# Patient Record
Sex: MALE | Race: OTHER | NOT HISPANIC OR LATINO | URBAN - METROPOLITAN AREA
[De-identification: names, ages, dates, MRNs, and addresses within clinical notes are randomized per-mention and may not be internally consistent; named-entity substitution may affect disease eponyms.]

---

## 2017-11-17 ENCOUNTER — EMERGENCY (EMERGENCY)
Facility: HOSPITAL | Age: 62
LOS: 1 days | Discharge: HOME | End: 2017-11-17
Attending: EMERGENCY MEDICINE | Admitting: EMERGENCY MEDICINE
Payer: COMMERCIAL

## 2017-11-17 VITALS
HEART RATE: 86 BPM | DIASTOLIC BLOOD PRESSURE: 69 MMHG | RESPIRATION RATE: 18 BRPM | SYSTOLIC BLOOD PRESSURE: 131 MMHG | OXYGEN SATURATION: 97 %

## 2017-11-17 VITALS — HEIGHT: 66 IN | WEIGHT: 285.06 LBS

## 2017-11-17 LAB
ALBUMIN SERPL ELPH-MCNC: 4.5 G/DL — SIGNIFICANT CHANGE UP (ref 3.3–5.2)
ALP SERPL-CCNC: 77 U/L — SIGNIFICANT CHANGE UP (ref 40–120)
ALT FLD-CCNC: 33 U/L — SIGNIFICANT CHANGE UP
ANION GAP SERPL CALC-SCNC: 15 MMOL/L — SIGNIFICANT CHANGE UP (ref 5–17)
APTT BLD: 32.6 SEC — SIGNIFICANT CHANGE UP (ref 27.5–37.4)
AST SERPL-CCNC: 33 U/L — SIGNIFICANT CHANGE UP
BASOPHILS # BLD AUTO: 0 K/UL — SIGNIFICANT CHANGE UP (ref 0–0.2)
BASOPHILS NFR BLD AUTO: 0.5 % — SIGNIFICANT CHANGE UP (ref 0–2)
BILIRUB SERPL-MCNC: 0.7 MG/DL — SIGNIFICANT CHANGE UP (ref 0.4–2)
BUN SERPL-MCNC: 11 MG/DL — SIGNIFICANT CHANGE UP (ref 8–20)
CALCIUM SERPL-MCNC: 9.6 MG/DL — SIGNIFICANT CHANGE UP (ref 8.6–10.2)
CHLORIDE SERPL-SCNC: 95 MMOL/L — LOW (ref 98–107)
CO2 SERPL-SCNC: 24 MMOL/L — SIGNIFICANT CHANGE UP (ref 22–29)
CREAT SERPL-MCNC: 0.72 MG/DL — SIGNIFICANT CHANGE UP (ref 0.5–1.3)
EOSINOPHIL # BLD AUTO: 0.2 K/UL — SIGNIFICANT CHANGE UP (ref 0–0.5)
EOSINOPHIL NFR BLD AUTO: 2.9 % — SIGNIFICANT CHANGE UP (ref 0–5)
GLUCOSE SERPL-MCNC: 146 MG/DL — HIGH (ref 70–115)
HCT VFR BLD CALC: 40.2 % — LOW (ref 42–52)
HGB BLD-MCNC: 14.4 G/DL — SIGNIFICANT CHANGE UP (ref 14–18)
INR BLD: 1.04 RATIO — SIGNIFICANT CHANGE UP (ref 0.88–1.16)
LYMPHOCYTES # BLD AUTO: 2 K/UL — SIGNIFICANT CHANGE UP (ref 1–4.8)
LYMPHOCYTES # BLD AUTO: 24.8 % — SIGNIFICANT CHANGE UP (ref 20–55)
MCHC RBC-ENTMCNC: 30.3 PG — SIGNIFICANT CHANGE UP (ref 27–31)
MCHC RBC-ENTMCNC: 35.8 G/DL — SIGNIFICANT CHANGE UP (ref 32–36)
MCV RBC AUTO: 84.5 FL — SIGNIFICANT CHANGE UP (ref 80–94)
MONOCYTES # BLD AUTO: 0.8 K/UL — SIGNIFICANT CHANGE UP (ref 0–0.8)
MONOCYTES NFR BLD AUTO: 10 % — SIGNIFICANT CHANGE UP (ref 3–10)
NEUTROPHILS # BLD AUTO: 5 K/UL — SIGNIFICANT CHANGE UP (ref 1.8–8)
NEUTROPHILS NFR BLD AUTO: 61.3 % — SIGNIFICANT CHANGE UP (ref 37–73)
PLAT MORPH BLD: NORMAL — SIGNIFICANT CHANGE UP
PLATELET # BLD AUTO: 267 K/UL — SIGNIFICANT CHANGE UP (ref 150–400)
POTASSIUM SERPL-MCNC: 4.5 MMOL/L — SIGNIFICANT CHANGE UP (ref 3.5–5.3)
POTASSIUM SERPL-SCNC: 4.5 MMOL/L — SIGNIFICANT CHANGE UP (ref 3.5–5.3)
PROT SERPL-MCNC: 7.3 G/DL — SIGNIFICANT CHANGE UP (ref 6.6–8.7)
PROTHROM AB SERPL-ACNC: 11.4 SEC — SIGNIFICANT CHANGE UP (ref 9.8–12.7)
RBC # BLD: 4.76 M/UL — SIGNIFICANT CHANGE UP (ref 4.6–6.2)
RBC # FLD: 12.9 % — SIGNIFICANT CHANGE UP (ref 11–15.6)
RBC BLD AUTO: NORMAL — SIGNIFICANT CHANGE UP
SODIUM SERPL-SCNC: 134 MMOL/L — LOW (ref 135–145)
TROPONIN T SERPL-MCNC: <0.01 NG/ML — SIGNIFICANT CHANGE UP (ref 0–0.06)
TROPONIN T SERPL-MCNC: <0.01 NG/ML — SIGNIFICANT CHANGE UP (ref 0–0.06)
WBC # BLD: 8.2 K/UL — SIGNIFICANT CHANGE UP (ref 4.8–10.8)
WBC # FLD AUTO: 8.2 K/UL — SIGNIFICANT CHANGE UP (ref 4.8–10.8)

## 2017-11-17 PROCEDURE — 70450 CT HEAD/BRAIN W/O DYE: CPT

## 2017-11-17 PROCEDURE — 93005 ELECTROCARDIOGRAM TRACING: CPT

## 2017-11-17 PROCEDURE — 80053 COMPREHEN METABOLIC PANEL: CPT

## 2017-11-17 PROCEDURE — 71046 X-RAY EXAM CHEST 2 VIEWS: CPT

## 2017-11-17 PROCEDURE — 70450 CT HEAD/BRAIN W/O DYE: CPT | Mod: 26

## 2017-11-17 PROCEDURE — 71020: CPT | Mod: 26

## 2017-11-17 PROCEDURE — 85730 THROMBOPLASTIN TIME PARTIAL: CPT

## 2017-11-17 PROCEDURE — 99284 EMERGENCY DEPT VISIT MOD MDM: CPT | Mod: 25

## 2017-11-17 PROCEDURE — 93010 ELECTROCARDIOGRAM REPORT: CPT

## 2017-11-17 PROCEDURE — 85610 PROTHROMBIN TIME: CPT

## 2017-11-17 PROCEDURE — 85027 COMPLETE CBC AUTOMATED: CPT

## 2017-11-17 PROCEDURE — 99285 EMERGENCY DEPT VISIT HI MDM: CPT

## 2017-11-17 PROCEDURE — 84484 ASSAY OF TROPONIN QUANT: CPT

## 2017-11-17 PROCEDURE — 85379 FIBRIN DEGRADATION QUANT: CPT

## 2017-11-17 PROCEDURE — 36415 COLL VENOUS BLD VENIPUNCTURE: CPT

## 2017-11-17 RX ORDER — LISINOPRIL 2.5 MG/1
1 TABLET ORAL
Qty: 0 | Refills: 0 | COMMUNITY

## 2017-11-17 RX ORDER — ACETAMINOPHEN 500 MG
975 TABLET ORAL ONCE
Qty: 0 | Refills: 0 | Status: DISCONTINUED | OUTPATIENT
Start: 2017-11-17 | End: 2017-11-21

## 2017-11-17 RX ORDER — ASPIRIN/CALCIUM CARB/MAGNESIUM 324 MG
324 TABLET ORAL DAILY
Qty: 0 | Refills: 0 | Status: DISCONTINUED | OUTPATIENT
Start: 2017-11-17 | End: 2017-11-21

## 2017-11-17 RX ORDER — FAMOTIDINE 10 MG/ML
20 INJECTION INTRAVENOUS DAILY
Qty: 0 | Refills: 0 | Status: DISCONTINUED | OUTPATIENT
Start: 2017-11-17 | End: 2017-11-21

## 2017-11-17 RX ADMIN — FAMOTIDINE 20 MILLIGRAM(S): 10 INJECTION INTRAVENOUS at 19:34

## 2017-11-17 RX ADMIN — Medication 324 MILLIGRAM(S): at 18:39

## 2017-11-17 RX ADMIN — Medication 30 MILLILITER(S): at 19:34

## 2017-11-17 NOTE — ED PROVIDER NOTE - MEDICAL DECISION MAKING DETAILS
cp will fu ekg trop to eval for acs, cxr to eval for pna; bnp to ro chf; d-dimer as HR >100 less to ro PE; labs to check electrolytes--cardiology consult (Dr. Cutler)--reassess cp will fu ekg trop to eval for acs, cxr to eval for pna; bnp to ro chf; d-dimer as HR >100 less to ro PE; labs to check electrolytes--cardiology consult (Dr. Cutler); ct head to eval for cva/bleed - ?cva synptoms mumbling but notes similar presentation with pain in the past--reassess

## 2017-11-17 NOTE — ED ADULT TRIAGE NOTE - CHIEF COMPLAINT QUOTE
Pt axox3 c/o chest pain starting x1 hr ago . Pts cardiologist is md Mccallum , is pending a stress test and was recently prescribed nitroglycerin tablets.

## 2017-11-17 NOTE — ED PROVIDER NOTE - OBJECTIVE STATEMENT
61yo M hx of htn hld dm p/w left sided cp since 12pm today pt took 1 nitroglycerin without relief, notes that was on the phone with his Cardiologist when had the pain who told him to come to the ED. Denies f/c/n/v/palpitations/ cough/rash/headache/dizziness/abd.pain/d/c/dysuria/hematuria. no sick contacts or recent travel. no hx of dvt/pe. notes that is scheduled for an echo on monday and then a nuclear stress test next week. notes intermittent cp to left side x 2 weeks. and also sob on exertion. 63yo M hx of htn hld dm p/w left sided cp since 12pm today pt took 1 nitroglycerin without relief, notes that was on the phone with his Cardiologist when had the pain who told him to come to the ED. also notes that was mumbling on the phone that lasted for 5 - 10 minutes - thinks bc he had cp - as similar presentations before with pain. no weakness to extremities no slurred speech no facial droop. no hx of strokes.   Denies f/c/n/v/palpitations/ cough/rash/headache/dizziness/abd.pain/d/c/dysuria/hematuria. no sick contacts or recent travel. no hx of dvt/pe. notes that is scheduled for an echo on monday and then a nuclear stress test next week. notes intermittent cp to left side x 2 weeks. and also sob on exertion.

## 2017-11-17 NOTE — ED ADULT NURSE NOTE - PMH
Hypertension, unspecified type    Type 2 diabetes mellitus without complication, without long-term current use of insulin

## 2017-11-17 NOTE — ED PROVIDER NOTE - PROGRESS NOTE DETAILS
spoke to Dr. Cutler notes that ok to dc if 2 trops neg will see pt in the office pt is scheduled for a stress test next week pt denies any symptoms at this time labs wnl--will dc with cardiology follow up

## 2017-11-17 NOTE — ED ADULT NURSE NOTE - OBJECTIVE STATEMENT
61 y/o male with anne of htn, dm, hld c/o of left sided chest pressure since 1200 today unrelieved with nitro .  c/o of episode of "mumbling speech" as per spouse while on the phone. Pt. is cardiac monitor with ns rhythm. Pt. is a.o x3. skin is warm, dry and intact. Radial pulses equal and regular with no pitting edema present. Pt. denies any other pain or SOB at this time. Will continue to monitor pt. Pt. c/o of previous episode x3 weeks ago and was seen by cardiologist and was scheduled to have echo on Wednesday and NST on 1 Dec.

## 2018-06-12 ENCOUNTER — INPATIENT (INPATIENT)
Facility: HOSPITAL | Age: 63
LOS: 1 days | Discharge: HOME | DRG: 247 | End: 2018-06-14
Attending: INTERNAL MEDICINE | Admitting: EMERGENCY MEDICINE
Payer: COMMERCIAL

## 2018-06-12 VITALS — HEIGHT: 66 IN | WEIGHT: 229.94 LBS

## 2018-06-12 DIAGNOSIS — R07.9 CHEST PAIN, UNSPECIFIED: ICD-10-CM

## 2018-06-12 DIAGNOSIS — G56.03 CARPAL TUNNEL SYNDROME, BILATERAL UPPER LIMBS: Chronic | ICD-10-CM

## 2018-06-12 PROBLEM — I10 ESSENTIAL (PRIMARY) HYPERTENSION: Chronic | Status: ACTIVE | Noted: 2017-11-17

## 2018-06-12 PROBLEM — E11.9 TYPE 2 DIABETES MELLITUS WITHOUT COMPLICATIONS: Chronic | Status: ACTIVE | Noted: 2017-11-17

## 2018-06-12 LAB
ALBUMIN SERPL ELPH-MCNC: 4.2 G/DL — SIGNIFICANT CHANGE UP (ref 3.3–5.2)
ALP SERPL-CCNC: 87 U/L — SIGNIFICANT CHANGE UP (ref 40–120)
ALT FLD-CCNC: 31 U/L — SIGNIFICANT CHANGE UP
ANION GAP SERPL CALC-SCNC: 14 MMOL/L — SIGNIFICANT CHANGE UP (ref 5–17)
APTT BLD: 31.9 SEC — SIGNIFICANT CHANGE UP (ref 27.5–37.4)
AST SERPL-CCNC: 30 U/L — SIGNIFICANT CHANGE UP
BASOPHILS # BLD AUTO: 0 K/UL — SIGNIFICANT CHANGE UP (ref 0–0.2)
BASOPHILS NFR BLD AUTO: 0.2 % — SIGNIFICANT CHANGE UP (ref 0–2)
BILIRUB SERPL-MCNC: 0.5 MG/DL — SIGNIFICANT CHANGE UP (ref 0.4–2)
BUN SERPL-MCNC: 12 MG/DL — SIGNIFICANT CHANGE UP (ref 8–20)
CALCIUM SERPL-MCNC: 9.4 MG/DL — SIGNIFICANT CHANGE UP (ref 8.6–10.2)
CHLORIDE SERPL-SCNC: 95 MMOL/L — LOW (ref 98–107)
CK MB CFR SERPL CALC: 12.3 NG/ML — HIGH (ref 0–6.7)
CK MB CFR SERPL CALC: 17.4 NG/ML — HIGH (ref 0–6.7)
CK MB CFR SERPL CALC: 19.7 NG/ML — HIGH (ref 0–6.7)
CK SERPL-CCNC: 437 U/L — HIGH (ref 30–200)
CK SERPL-CCNC: 450 U/L — HIGH (ref 30–200)
CK SERPL-CCNC: 459 U/L — HIGH (ref 30–200)
CO2 SERPL-SCNC: 23 MMOL/L — SIGNIFICANT CHANGE UP (ref 22–29)
CREAT SERPL-MCNC: 0.68 MG/DL — SIGNIFICANT CHANGE UP (ref 0.5–1.3)
EOSINOPHIL # BLD AUTO: 0.2 K/UL — SIGNIFICANT CHANGE UP (ref 0–0.5)
EOSINOPHIL NFR BLD AUTO: 2.4 % — SIGNIFICANT CHANGE UP (ref 0–5)
GLUCOSE BLDC GLUCOMTR-MCNC: 196 MG/DL — HIGH (ref 70–99)
GLUCOSE SERPL-MCNC: 266 MG/DL — HIGH (ref 70–115)
HCT VFR BLD CALC: 41.4 % — LOW (ref 42–52)
HGB BLD-MCNC: 14.4 G/DL — SIGNIFICANT CHANGE UP (ref 14–18)
INR BLD: 1 RATIO — SIGNIFICANT CHANGE UP (ref 0.88–1.16)
LYMPHOCYTES # BLD AUTO: 1.5 K/UL — SIGNIFICANT CHANGE UP (ref 1–4.8)
LYMPHOCYTES # BLD AUTO: 16.6 % — LOW (ref 20–55)
MAGNESIUM SERPL-MCNC: 1.7 MG/DL — SIGNIFICANT CHANGE UP (ref 1.6–2.6)
MCHC RBC-ENTMCNC: 29.3 PG — SIGNIFICANT CHANGE UP (ref 27–31)
MCHC RBC-ENTMCNC: 34.8 G/DL — SIGNIFICANT CHANGE UP (ref 32–36)
MCV RBC AUTO: 84.3 FL — SIGNIFICANT CHANGE UP (ref 80–94)
MONOCYTES # BLD AUTO: 0.9 K/UL — HIGH (ref 0–0.8)
MONOCYTES NFR BLD AUTO: 10.4 % — HIGH (ref 3–10)
NEUTROPHILS # BLD AUTO: 6.1 K/UL — SIGNIFICANT CHANGE UP (ref 1.8–8)
NEUTROPHILS NFR BLD AUTO: 69.8 % — SIGNIFICANT CHANGE UP (ref 37–73)
PLATELET # BLD AUTO: 241 K/UL — SIGNIFICANT CHANGE UP (ref 150–400)
POTASSIUM SERPL-MCNC: 4.4 MMOL/L — SIGNIFICANT CHANGE UP (ref 3.5–5.3)
POTASSIUM SERPL-SCNC: 4.4 MMOL/L — SIGNIFICANT CHANGE UP (ref 3.5–5.3)
PROT SERPL-MCNC: 6.8 G/DL — SIGNIFICANT CHANGE UP (ref 6.6–8.7)
PROTHROM AB SERPL-ACNC: 11 SEC — SIGNIFICANT CHANGE UP (ref 9.8–12.7)
RBC # BLD: 4.91 M/UL — SIGNIFICANT CHANGE UP (ref 4.6–6.2)
RBC # FLD: 13 % — SIGNIFICANT CHANGE UP (ref 11–15.6)
SODIUM SERPL-SCNC: 132 MMOL/L — LOW (ref 135–145)
TROPONIN T SERPL-MCNC: 0.01 NG/ML — SIGNIFICANT CHANGE UP (ref 0–0.06)
TROPONIN T SERPL-MCNC: 0.05 NG/ML — SIGNIFICANT CHANGE UP (ref 0–0.06)
TROPONIN T SERPL-MCNC: 0.09 NG/ML — HIGH (ref 0–0.06)
WBC # BLD: 8.8 K/UL — SIGNIFICANT CHANGE UP (ref 4.8–10.8)
WBC # FLD AUTO: 8.8 K/UL — SIGNIFICANT CHANGE UP (ref 4.8–10.8)

## 2018-06-12 PROCEDURE — 75574 CT ANGIO HRT W/3D IMAGE: CPT | Mod: 26

## 2018-06-12 PROCEDURE — 99223 1ST HOSP IP/OBS HIGH 75: CPT | Mod: GC

## 2018-06-12 PROCEDURE — 71045 X-RAY EXAM CHEST 1 VIEW: CPT | Mod: 26

## 2018-06-12 PROCEDURE — 93010 ELECTROCARDIOGRAM REPORT: CPT | Mod: 76

## 2018-06-12 PROCEDURE — 99218: CPT

## 2018-06-12 PROCEDURE — 93306 TTE W/DOPPLER COMPLETE: CPT | Mod: 26

## 2018-06-12 RX ORDER — DEXTROSE 50 % IN WATER 50 %
25 SYRINGE (ML) INTRAVENOUS ONCE
Qty: 0 | Refills: 0 | Status: DISCONTINUED | OUTPATIENT
Start: 2018-06-12 | End: 2018-06-14

## 2018-06-12 RX ORDER — SITAGLIPTIN 50 MG/1
1 TABLET, FILM COATED ORAL
Qty: 0 | Refills: 0 | COMMUNITY

## 2018-06-12 RX ORDER — DEXTROSE 50 % IN WATER 50 %
12.5 SYRINGE (ML) INTRAVENOUS ONCE
Qty: 0 | Refills: 0 | Status: DISCONTINUED | OUTPATIENT
Start: 2018-06-12 | End: 2018-06-14

## 2018-06-12 RX ORDER — NITROGLYCERIN 6.5 MG
0.4 CAPSULE, EXTENDED RELEASE ORAL
Qty: 0 | Refills: 0 | Status: DISCONTINUED | OUTPATIENT
Start: 2018-06-12 | End: 2018-06-14

## 2018-06-12 RX ORDER — ISOSORBIDE DINITRATE 5 MG/1
1 TABLET ORAL
Qty: 0 | Refills: 0 | COMMUNITY

## 2018-06-12 RX ORDER — NIACIN 50 MG
500 TABLET ORAL DAILY
Qty: 0 | Refills: 0 | Status: DISCONTINUED | OUTPATIENT
Start: 2018-06-12 | End: 2018-06-14

## 2018-06-12 RX ORDER — INSULIN LISPRO 100/ML
VIAL (ML) SUBCUTANEOUS
Qty: 0 | Refills: 0 | Status: DISCONTINUED | OUTPATIENT
Start: 2018-06-12 | End: 2018-06-14

## 2018-06-12 RX ORDER — LISINOPRIL 2.5 MG/1
1 TABLET ORAL
Qty: 0 | Refills: 0 | COMMUNITY

## 2018-06-12 RX ORDER — METOPROLOL TARTRATE 50 MG
25 TABLET ORAL EVERY 12 HOURS
Qty: 0 | Refills: 0 | Status: DISCONTINUED | OUTPATIENT
Start: 2018-06-12 | End: 2018-06-14

## 2018-06-12 RX ORDER — METOPROLOL TARTRATE 50 MG
50 TABLET ORAL ONCE
Qty: 0 | Refills: 0 | Status: COMPLETED | OUTPATIENT
Start: 2018-06-12 | End: 2018-06-12

## 2018-06-12 RX ORDER — GLUCAGON INJECTION, SOLUTION 0.5 MG/.1ML
1 INJECTION, SOLUTION SUBCUTANEOUS ONCE
Qty: 0 | Refills: 0 | Status: DISCONTINUED | OUTPATIENT
Start: 2018-06-12 | End: 2018-06-14

## 2018-06-12 RX ORDER — LISINOPRIL 2.5 MG/1
5 TABLET ORAL DAILY
Qty: 0 | Refills: 0 | Status: DISCONTINUED | OUTPATIENT
Start: 2018-06-12 | End: 2018-06-12

## 2018-06-12 RX ORDER — ENOXAPARIN SODIUM 100 MG/ML
100 INJECTION SUBCUTANEOUS ONCE
Qty: 0 | Refills: 0 | Status: COMPLETED | OUTPATIENT
Start: 2018-06-12 | End: 2018-06-12

## 2018-06-12 RX ORDER — DEXTROSE 50 % IN WATER 50 %
15 SYRINGE (ML) INTRAVENOUS ONCE
Qty: 0 | Refills: 0 | Status: DISCONTINUED | OUTPATIENT
Start: 2018-06-12 | End: 2018-06-14

## 2018-06-12 RX ORDER — POTASSIUM CHLORIDE 20 MEQ
1 PACKET (EA) ORAL
Qty: 0 | Refills: 0 | COMMUNITY

## 2018-06-12 RX ORDER — NIACIN 50 MG
0 TABLET ORAL
Qty: 0 | Refills: 0 | COMMUNITY

## 2018-06-12 RX ORDER — LISINOPRIL 2.5 MG/1
20 TABLET ORAL DAILY
Qty: 0 | Refills: 0 | Status: DISCONTINUED | OUTPATIENT
Start: 2018-06-12 | End: 2018-06-14

## 2018-06-12 RX ORDER — ATORVASTATIN CALCIUM 80 MG/1
80 TABLET, FILM COATED ORAL AT BEDTIME
Qty: 0 | Refills: 0 | Status: DISCONTINUED | OUTPATIENT
Start: 2018-06-12 | End: 2018-06-14

## 2018-06-12 RX ORDER — ASPIRIN/CALCIUM CARB/MAGNESIUM 324 MG
81 TABLET ORAL DAILY
Qty: 0 | Refills: 0 | Status: DISCONTINUED | OUTPATIENT
Start: 2018-06-12 | End: 2018-06-14

## 2018-06-12 RX ORDER — ASPIRIN/CALCIUM CARB/MAGNESIUM 324 MG
325 TABLET ORAL ONCE
Qty: 0 | Refills: 0 | Status: COMPLETED | OUTPATIENT
Start: 2018-06-12 | End: 2018-06-12

## 2018-06-12 RX ORDER — SODIUM CHLORIDE 9 MG/ML
1000 INJECTION, SOLUTION INTRAVENOUS
Qty: 0 | Refills: 0 | Status: DISCONTINUED | OUTPATIENT
Start: 2018-06-12 | End: 2018-06-14

## 2018-06-12 RX ORDER — MORPHINE SULFATE 50 MG/1
2 CAPSULE, EXTENDED RELEASE ORAL ONCE
Qty: 0 | Refills: 0 | Status: DISCONTINUED | OUTPATIENT
Start: 2018-06-12 | End: 2018-06-14

## 2018-06-12 RX ORDER — HYDROCHLOROTHIAZIDE 25 MG
25 TABLET ORAL DAILY
Qty: 0 | Refills: 0 | Status: DISCONTINUED | OUTPATIENT
Start: 2018-06-12 | End: 2018-06-14

## 2018-06-12 RX ORDER — NIACIN 50 MG
1 TABLET ORAL
Qty: 0 | Refills: 0 | COMMUNITY

## 2018-06-12 RX ADMIN — ATORVASTATIN CALCIUM 80 MILLIGRAM(S): 80 TABLET, FILM COATED ORAL at 22:27

## 2018-06-12 RX ADMIN — Medication 1: at 22:27

## 2018-06-12 RX ADMIN — Medication 50 MILLIGRAM(S): at 10:19

## 2018-06-12 RX ADMIN — Medication 325 MILLIGRAM(S): at 20:11

## 2018-06-12 RX ADMIN — ENOXAPARIN SODIUM 100 MILLIGRAM(S): 100 INJECTION SUBCUTANEOUS at 20:11

## 2018-06-12 NOTE — ED PROVIDER NOTE - PROGRESS NOTE DETAILS
Discussed the case with cardiology Dr. Valle, covering for the patient's cardiologist Dr. Cutler. He states that given the uptrending troponin pt will require further tele monitoring and third set of enzymes. He advises overnight serial ekgs/trops, but states that his group will be unable to see the pt until tomorrow morning.

## 2018-06-12 NOTE — H&P ADULT - NSHPREVIEWOFSYSTEMS_GEN_ALL_CORE
positive: chest pain, SOB  negative: headache, fever, chills, palpitations, N/V, constipation, diarrhea, dysuria, hematuria, recent travel, LE swelling, calf tenderness or sick contacts

## 2018-06-12 NOTE — ED CDU PROVIDER INITIAL DAY NOTE - MEDICAL DECISION MAKING DETAILS
Pt with elevated calcium score and uptrending troponins. EKG non-ischemic. Cardio advises tele and will come to evaluate tomorrow am. Will place on observation

## 2018-06-12 NOTE — ED PROVIDER NOTE - OBJECTIVE STATEMENT
64 y/o M with a hx of DM and HTN pt presents to the ED c/o constant, worsening, stabbing mid-chest pain that radiates to his left arm pit area. Notes that he took Isosorbide, prescribed by his PMD, and his pain alleviated. Notes he has had a previous stress test and an overnight at [facility], has not seen his cardiologist in over a year.  Notes he has pedal edema at baseline. Former smoker. Denies cardiac hx, fever, chills. No further complaints at this time.

## 2018-06-12 NOTE — ED ADULT NURSE REASSESSMENT NOTE - NS ED NURSE REASSESS COMMENT FT1
Report given to receiving RN Keri, pt placed on portable monitor, awaiting transport to floor, pt aware of plan of care.

## 2018-06-12 NOTE — H&P ADULT - HISTORY OF PRESENT ILLNESS
64 y/o M w/ PMH of DM2, HTN & HLD presents w/ chest pain x4 days. Patient stated that his chest pain began 4 days ago & have been progressively worsening. He described the pain as tightness over epigastric/sternal, 10/10, constant, exacerbated w/ movements, alleviated w/ rest or nitroglycerin & associated w/ SOB. Patient admits to pedal edema but denies of any headache, fever, chills, palpitations, N/V, constipation, diarrhea, dysuria, hematuria, recent travel, LE swelling, calf tenderness or sick contacts. Patient never been a smoker. In the ED patient was found to have elevate troponins w/ normal EKG in the setting of elevated calcium score on cardiac CTA.  Cardiology (Dr. Valle covering for the patient's cardiologist Dr. Cutler) recommended monitoring on telemetry & trend cardiac enzymes until patient is seen tomorrow morning.      Patient is full code

## 2018-06-12 NOTE — H&P ADULT - ATTENDING COMMENTS
Patient seen and evaluated in ER for NSTEMI. Continue Aspirin, Lipitor, Metoprolol and Nitroglycerin. Continue home medications. Cardiology Eval and possible cath in am.   Needs lifestyle modification.

## 2018-06-12 NOTE — H&P ADULT - ASSESSMENT
62 y/o M w/ PMH of DM2, HTN & HLD presents w / chest pain admitted for NSTEMI.     NSTEMI  >Admit to medicine-resident service under DrMireya    >Bed:    >Diet:    >Activity: ambulate as tolerated  >Nursing: vitals per routine  >IV fluids:  >Labs: CBC, CMP, PT/INR, PTT, Mg, Phos  >Imaging:  >Consults:    DM2  >Insulin sliding scale & hypoglycemic protocol   >f/u HgbA1c    HTN  >stable  >Lisinopril 5 mg PO daily & metoprolol 25 mg PO Q12H w/ holding parameters     HLD  >Lipitor 80 mg PO QHS  >f/u lipid panel     DVT prophylaxis   >Lovenox 40 mg SQ Q24H 62 y/o M w/ PMH of DM2, HTN & HLD presents w / chest pain admitted for NSTEMI.     NSTEMI  >Admit to medicine-resident service under Dr. Hall  >Bed: telemetry  >Diet: NPO  >Activity: bedrest   >Nursing: vitals per routine  >Labs: CBC, CMP, PT/INR, PTT, Mg, Phos  >Imaging:   >Consults: Cardiology (Dr. Valle); Cardiology will come to evaluate tomorrow AM for possible cardiac cauterization     DM2  >Insulin sliding scale & hypoglycemic protocol   >f/u HgbA1c    HTN  >stable  >Lisinopril 5 mg PO daily & metoprolol 25 mg PO Q12H w/ holding parameters     HLD  >Lipitor 80 mg PO QHS  >f/u lipid panel     DVT prophylaxis   >Lovenox 40 mg SQ Q24H 64 y/o M w/ PMH of DM2, HTN & HLD presents w / chest pain admitted for NSTEMI.     NSTEMI  >Admit to medicine-resident service under Dr. Hall  >Bed: telemetry  >Diet: NPO  >Activity: bedrest   >Nursing: vitals per routine  >Labs: CBC, CMP, PT/INR, PTT, Mg, Phos  >Imaging: CXR & cardiac CTA were negative  >Consults: Cardiology (Dr. Valle); Cardiology will come to evaluate tomorrow AM for possible cardiac cauterization     DM2  >Insulin sliding scale & hypoglycemic protocol   >f/u HgbA1c    HTN  >stable  >Lisinopril 5 mg PO daily & metoprolol 25 mg PO Q12H w/ holding parameters     HLD  >Lipitor 80 mg PO QHS  >f/u lipid panel     DVT prophylaxis   >Lovenox 40 mg SQ Q24H 64 y/o M w/ PMH of DM2, HTN & HLD presents w / chest pain admitted for NSTEMI.     NSTEMI  >Admit to medicine-resident service under Dr. Hall  >Bed: telemetry  >Diet: NPO  >Activity: bedrest   >Nursing: vitals per routine  >Labs: CBC, CMP, PT/INR, PTT, Mg, Phos  >Imaging: CXR & cardiac CTA were negative  >Consults: Cardiology (Dr. Valle); Cardiology will come to evaluate tomorrow AM for possible cardiac cauterization   >s/p ASA 325mg x1 in ED; now on ASA 81 mg PO daily  >Metoprolol 25 mg PO Q12H w/ holding parameters  >Lipitor 80 mg PO QHS  >Nitroglycernin Sublingual PRN chest pain  >Morphine 2mg IVP PRN severe pain     DM2  >Insulin sliding scale & hypoglycemic protocol   >f/u HgbA1c    HTN  >stable  >c/w home med of Lisinopril-HCTZ 20mg/25mg PO daily w/ holding parameters  >added metoprolol 25 mg PO Q12H w/ holding parameters     HLD  >Lipitor 80 mg PO QHS  >f/u lipid panel     DVT prophylaxis   >Lovenox 40 mg SQ Q24H 62 y/o M w/ PMH of DM2, HTN & HLD presents w / chest pain admitted for NSTEMI. ASCVD 26.5%    NSTEMI  >Admit to medicine-resident service under Dr. Hall  >Bed: telemetry  >Diet: NPO  >Activity: bedrest   >Nursing: vitals per routine  >Labs: CBC, CMP, PT/INR, PTT, Mg, Phos  >Imaging: CXR & cardiac CTA were negative  >Consults: Cardiology (Dr. Valle); Cardiology will come to evaluate tomorrow AM for possible cardiac cauterization   >s/p ASA 325mg x1 in ED; now on ASA 81 mg PO daily  >Metoprolol 25 mg PO Q12H w/ holding parameters  >Lipitor 80 mg PO QHS  >Nitroglycernin Sublingual PRN chest pain  >Morphine 2mg IVP PRN severe pain     DM2  >Insulin sliding scale & hypoglycemic protocol   >f/u HgbA1c    HTN  >stable  >c/w home med of Lisinopril-HCTZ 20mg/25mg PO daily w/ holding parameters  >added metoprolol 25 mg PO Q12H w/ holding parameters     HLD  >ASCVD 26.5%; started on high-intensity statins   >Lipitor 80 mg PO QHS  >f/u lipid panel     DVT prophylaxis   >Lovenox 40 mg SQ Q24H 62 y/o M w/ PMH of DM2, HTN & HLD presents w / chest pain admitted for NSTEMI. ASCVD 26.5%    NSTEMI  >Admit to medicine-resident service under Dr. Hall  >Bed: telemetry  >Diet: NPO  >Activity: ambulate as tolerated   >Nursing: vitals per routine  >Labs: CBC, CMP, PT/INR, PTT, Mg, Phos  >Imaging: CXR & cardiac CTA were negative  >Consults: Cardiology (Dr. Valle); Cardiology will come to evaluate tomorrow AM for possible cardiac cauterization   >s/p ASA 325mg x1 in ED; now on ASA 81 mg PO daily  >Metoprolol 25 mg PO Q12H w/ holding parameters  >Lipitor 80 mg PO QHS  >Nitroglycernin Sublingual PRN chest pain  >Morphine 2mg IVP PRN severe pain     DM2  >Insulin sliding scale & hypoglycemic protocol   >f/u HgbA1c    HTN  >stable  >c/w home med of Lisinopril-HCTZ 20mg/25mg PO daily w/ holding parameters  >added metoprolol 25 mg PO Q12H w/ holding parameters     HLD  >ASCVD 26.5%; started on high-intensity statins   >Lipitor 80 mg PO QHS  >f/u lipid panel     DVT prophylaxis   >Lovenox 40 mg SQ Q24H 62 y/o M w/ PMH of DM2, HTN & HLD presents w / chest pain admitted for NSTEMI s/p full-dose lovenox in ED. ASCVD 26.5%    NSTEMI  >Admit to medicine-resident service under Dr. Hall  >Bed: telemetry  >Diet: NPO  >Activity: ambulate as tolerated   >Nursing: vitals per routine  >Labs: CBC, CMP, PT/INR, PTT, Mg, Phos  >Imaging: CXR & cardiac CTA were negative  >Consults: Cardiology (Dr. Valle); Cardiology will come to evaluate tomorrow AM for possible cardiac cauterization   >s/p ASA 325mg x1 in ED; now on ASA 81 mg PO daily  >Metoprolol 25 mg PO Q12H w/ holding parameters  >Lipitor 80 mg PO QHS  >Nitroglycernin Sublingual PRN chest pain  >Morphine 2mg IVP PRN severe pain   >s/p full-dose lovenox in ED    DM2  >Insulin sliding scale & hypoglycemic protocol   >f/u HgbA1c    HTN  >stable  >c/w home med of Lisinopril-HCTZ 20mg/25mg PO daily w/ holding parameters  >added metoprolol 25 mg PO Q12H w/ holding parameters     HLD  >ASCVD 26.5%; started on high-intensity statins   >Lipitor 80 mg PO QHS  >f/u lipid panel     DVT prophylaxis   >Lovenox 40 mg SQ Q24H 62 y/o M w/ PMH of DM2, HTN & HLD presents w / chest pain admitted for NSTEMI s/p full-dose lovenox in ED. ASCVD 26.5%    NSTEMI  >Admit to medicine-resident service under Dr. Hall  >Bed: telemetry  >Diet: NPO  >Activity: ambulate as tolerated   >Nursing: vitals per routine  >Labs: CBC, CMP, PT/INR, PTT, Mg, Phos  >Imaging: CXR & cardiac CTA were negative  >Consults: Cardiology (Dr. Valle); Cardiology will come to evaluate tomorrow AM for possible cardiac cauterization   >s/p ASA 325mg x1 in ED; now on ASA 81 mg PO daily  >Metoprolol 25 mg PO Q12H w/ holding parameters  >Lipitor 80 mg PO QHS  >Nitroglycernin Sublingual PRN chest pain  >Morphine 2mg IVP PRN severe pain   >s/p full-dose lovenox in ED    DM2  >Insulin sliding scale & hypoglycemic protocol   >f/u HgbA1c    HTN  >stable  >c/w home med of Lisinopril-HCTZ 20mg/25mg PO daily w/ holding parameters  >added metoprolol 25 mg PO Q12H w/ holding parameters     HLD  >ASCVD 26.5%; started on high-intensity statins   >Lipitor 80 mg PO QHS  >f/u lipid panel     DVT prophylaxis   >s/p full-dose lovenox in ED  >will continue Lovenox 40 mg SQ Q24H if patient stays in the hospital 62 y/o M w/ PMH of DM2, HTN & HLD presents w / chest pain admitted for NSTEMI s/p full-dose lovenox in ED. ASCVD 26.5%    NSTEMI  >Admit to medicine-resident service under Dr. Hall  >Bed: telemetry  >Diet: NPO  >Activity: ambulate as tolerated   >Nursing: vitals per routine  >Labs: CBC, CMP, PT/INR, PTT, Mg, Phos  >Imaging: CXR & cardiac CTA were negative  >Consults: Cardiology (Dr. Valle); Cardiology will come to evaluate tomorrow AM for possible cardiac cauterization   >s/p ASA 325mg x1 in ED; now on ASA 81 mg PO daily  >Metoprolol 25 mg PO Q12H w/ holding parameters  >Lipitor 80 mg PO QHS  >Nitroglycernin Sublingual PRN chest pain  >Morphine 2mg IVP PRN severe pain   >s/p full-dose lovenox in ED    DM2  >Insulin sliding scale & hypoglycemic protocol   >f/u HgbA1c    HTN  >stable  >c/w home med of Lisinopril-HCTZ 20mg/25mg PO daily w/ holding parameters  >added metoprolol 25 mg PO Q12H w/ holding parameters     HLD  >Lipitor 80 mg PO QHS  >f/u lipid panel     DVT prophylaxis   >s/p full-dose lovenox in ED  >will continue Lovenox 40 mg SQ Q24H if patient stays in the hospital 64 y/o M w/ PMH of DM2, HTN & HLD presents w / chest pain admitted for NSTEMI s/p full-dose lovenox in ED. cardiac CTA showed elevated calcium score. ASCVD 26.5%    NSTEMI  >Admit to medicine-resident service under Dr. Hall  >Bed: telemetry  >Diet: NPO  >Activity: ambulate as tolerated   >Nursing: vitals per routine  >Labs: CBC, CMP, PT/INR, PTT, Mg, Phos  >Imaging: CXR was negative; cardiac CTA showed elevated calcium score.  >Consults: Cardiology (Dr. Valle); Cardiology will come to evaluate tomorrow AM for possible cardiac cauterization   >s/p ASA 325mg x1 in ED; now on ASA 81 mg PO daily  >Metoprolol 25 mg PO Q12H w/ holding parameters  >Lipitor 80 mg PO QHS  >Nitroglycernin Sublingual PRN chest pain  >Morphine 2mg IVP PRN severe pain   >s/p full-dose lovenox in ED    DM2  >Insulin sliding scale & hypoglycemic protocol   >f/u HgbA1c    HTN  >stable  >c/w home med of Lisinopril-HCTZ 20mg/25mg PO daily w/ holding parameters  >added metoprolol 25 mg PO Q12H w/ holding parameters     HLD  >Lipitor 80 mg PO QHS  >f/u lipid panel     DVT prophylaxis   >s/p full-dose lovenox in ED  >will continue Lovenox 40 mg SQ Q24H if patient stays in the hospital 64 y/o M w/ PMH of DM2, HTN & HLD presents w / chest pain admitted for NSTEMI s/p full-dose lovenox in ED. cardiac CTA showed elevated calcium score (75th percentile). SHABBIR score 3. ASCVD 26.5%    NSTEMI  >Admit to medicine-resident service under Dr. Hall  >Bed: telemetry  >Diet: NPO  >Activity: ambulate as tolerated   >Nursing: vitals per routine  >Labs: CBC, CMP, PT/INR, PTT, Mg, Phos  >Imaging: CXR was negative; cardiac CTA showed elevated calcium score.  >Consults: Cardiology (Dr. Valle); Cardiology will come to evaluate tomorrow AM for possible cardiac cauterization   >s/p ASA 325mg x1 in ED; now on ASA 81 mg PO daily  >Metoprolol 25 mg PO Q12H w/ holding parameters  >Lipitor 80 mg PO QHS  >Nitroglycernin Sublingual PRN chest pain  >Morphine 2mg IVP PRN severe pain   >s/p full-dose lovenox in ED    DM2  >Insulin sliding scale & hypoglycemic protocol   >f/u HgbA1c    HTN  >stable  >c/w home med of Lisinopril-HCTZ 20mg/25mg PO daily w/ holding parameters  >added metoprolol 25 mg PO Q12H w/ holding parameters     HLD  >Lipitor 80 mg PO QHS  >f/u lipid panel     DVT prophylaxis   >s/p full-dose lovenox in ED  >will continue Lovenox 40 mg SQ Q24H if patient stays in the hospital

## 2018-06-12 NOTE — ED PROVIDER NOTE - CARDIAC, MLM
Normal rate, regular rhythm.  Heart sounds S1, S2.  No murmurs, rubs or gallops. 2+ pitting edema to GOOD shins.

## 2018-06-12 NOTE — ED CDU PROVIDER INITIAL DAY NOTE - DETAILS
Pt with episode of typical chest pain, trops trending upwards, asymptomatic currently, will place for serial trops/ekgs/tele

## 2018-06-12 NOTE — ED ADULT NURSE REASSESSMENT NOTE - NS ED NURSE REASSESS COMMENT FT1
Assuming care from previous RN, pt AOx4, c/o SOB s/p ambulating to restroom prior to RN meeting, resp even and unlabored, skin warm and dry, color good, denies pain/n/v, patent 20G IV in right forearm, showing NSR on monitor, awaiting cardiology consult and admission orders, pt aware of plan of care, will continue to monitor.

## 2018-06-12 NOTE — H&P ADULT - FAMILY HISTORY
Father  Still living? Unknown  Family history of lung cancer, Age at diagnosis: Age Unknown     Sibling  Still living? Unknown  Family history of brain tumor, Age at diagnosis: Age Unknown

## 2018-06-12 NOTE — H&P ADULT - NSHPPHYSICALEXAM_GEN_ALL_CORE
T(F): 97.5  HR: 82  BP: 147/92  RR: 18  SpO2: 96% RA    Physical Exam:   GENERAL: well-groomed, well-developed, NAD, obese  HEENT: head NC/AT; EOM intact, PERRLA, conjunctiva & sclera clear; hearing grossly intact; no nasal congestion or discharge, no sinus tenderness, no tonsillar erythema or exudates, moist mucous membranes   NECK: supple, no JVD, no thyromegaly  RESPIRATORY: CTA B/L, no wheezing, rales, rhonchi or rubs  CARDIOVASCULAR: S1&S2, RRR, no murmurs or gallops  ABDOMEN: soft, non-tender, non-distended, BS+, no hernias, no hepatosplenomegaly, no CVA tenderness  MUSCULOSKELETAL: no muscle atrophy, no clubbing, cyanosis of extremities, no calf tenderness, 1+ pitting edema B/L   LYMPH: no lymphadenopathy  VASCULAR: peripheral pulses 2+, capillary refill < 2 seconds, no varicose veins   SKIN: No rashes, bruises or scars   NEUROLOGIC: AA&O X3, CN2-12 intact w/ no focal deficits, no sensory loss, motor Strength 5/5 in UE & LE B/L

## 2018-06-12 NOTE — H&P ADULT - PMH
Carpal tunnel syndrome, bilateral upper limbs  s/p surgical release  Hyperlipidemia    Hypertension, unspecified type    Type 2 diabetes mellitus without complication, without long-term current use of insulin

## 2018-06-13 ENCOUNTER — TRANSCRIPTION ENCOUNTER (OUTPATIENT)
Age: 63
End: 2018-06-13

## 2018-06-13 LAB
AMPHET UR-MCNC: NEGATIVE — SIGNIFICANT CHANGE UP
ANION GAP SERPL CALC-SCNC: 12 MMOL/L — SIGNIFICANT CHANGE UP (ref 5–17)
BARBITURATES UR SCN-MCNC: NEGATIVE — SIGNIFICANT CHANGE UP
BENZODIAZ UR-MCNC: NEGATIVE — SIGNIFICANT CHANGE UP
BUN SERPL-MCNC: 11 MG/DL — SIGNIFICANT CHANGE UP (ref 8–20)
CALCIUM SERPL-MCNC: 9.3 MG/DL — SIGNIFICANT CHANGE UP (ref 8.6–10.2)
CHLORIDE SERPL-SCNC: 95 MMOL/L — LOW (ref 98–107)
CHOLEST SERPL-MCNC: 172 MG/DL — SIGNIFICANT CHANGE UP (ref 110–199)
CK MB CFR SERPL CALC: 15.7 NG/ML — HIGH (ref 0–6.7)
CK SERPL-CCNC: 426 U/L — HIGH (ref 30–200)
CO2 SERPL-SCNC: 27 MMOL/L — SIGNIFICANT CHANGE UP (ref 22–29)
COCAINE METAB.OTHER UR-MCNC: NEGATIVE — SIGNIFICANT CHANGE UP
CREAT SERPL-MCNC: 0.77 MG/DL — SIGNIFICANT CHANGE UP (ref 0.5–1.3)
GLUCOSE BLDC GLUCOMTR-MCNC: 163 MG/DL — HIGH (ref 70–99)
GLUCOSE BLDC GLUCOMTR-MCNC: 174 MG/DL — HIGH (ref 70–99)
GLUCOSE BLDC GLUCOMTR-MCNC: 216 MG/DL — HIGH (ref 70–99)
GLUCOSE BLDC GLUCOMTR-MCNC: 217 MG/DL — HIGH (ref 70–99)
GLUCOSE SERPL-MCNC: 191 MG/DL — HIGH (ref 70–115)
HBA1C BLD-MCNC: 7.6 % — HIGH (ref 4–5.6)
HCT VFR BLD CALC: 39.8 % — LOW (ref 42–52)
HDLC SERPL-MCNC: 41 MG/DL — LOW
HGB BLD-MCNC: 14.1 G/DL — SIGNIFICANT CHANGE UP (ref 14–18)
LIPID PNL WITH DIRECT LDL SERPL: 60 MG/DL — SIGNIFICANT CHANGE UP
MAGNESIUM SERPL-MCNC: 1.8 MG/DL — SIGNIFICANT CHANGE UP (ref 1.8–2.6)
MCHC RBC-ENTMCNC: 29.9 PG — SIGNIFICANT CHANGE UP (ref 27–31)
MCHC RBC-ENTMCNC: 35.4 G/DL — SIGNIFICANT CHANGE UP (ref 32–36)
MCV RBC AUTO: 84.5 FL — SIGNIFICANT CHANGE UP (ref 80–94)
METHADONE UR-MCNC: NEGATIVE — SIGNIFICANT CHANGE UP
OB PNL STL: NEGATIVE — SIGNIFICANT CHANGE UP
OPIATES UR-MCNC: NEGATIVE — SIGNIFICANT CHANGE UP
PCP SPEC-MCNC: SIGNIFICANT CHANGE UP
PCP UR-MCNC: NEGATIVE — SIGNIFICANT CHANGE UP
PHOSPHATE SERPL-MCNC: 3.4 MG/DL — SIGNIFICANT CHANGE UP (ref 2.4–4.7)
PLATELET # BLD AUTO: 255 K/UL — SIGNIFICANT CHANGE UP (ref 150–400)
POTASSIUM SERPL-MCNC: 4.5 MMOL/L — SIGNIFICANT CHANGE UP (ref 3.5–5.3)
POTASSIUM SERPL-SCNC: 4.5 MMOL/L — SIGNIFICANT CHANGE UP (ref 3.5–5.3)
RBC # BLD: 4.71 M/UL — SIGNIFICANT CHANGE UP (ref 4.6–6.2)
RBC # FLD: 13 % — SIGNIFICANT CHANGE UP (ref 11–15.6)
SODIUM SERPL-SCNC: 134 MMOL/L — LOW (ref 135–145)
THC UR QL: NEGATIVE — SIGNIFICANT CHANGE UP
TOTAL CHOLESTEROL/HDL RATIO MEASUREMENT: 4 RATIO — SIGNIFICANT CHANGE UP (ref 3.4–9.6)
TRIGL SERPL-MCNC: 357 MG/DL — HIGH (ref 10–200)
TROPONIN T SERPL-MCNC: 0.14 NG/ML — HIGH (ref 0–0.06)
TROPONIN T SERPL-MCNC: 0.16 NG/ML — HIGH (ref 0–0.06)
WBC # BLD: 7.7 K/UL — SIGNIFICANT CHANGE UP (ref 4.8–10.8)
WBC # FLD AUTO: 7.7 K/UL — SIGNIFICANT CHANGE UP (ref 4.8–10.8)

## 2018-06-13 PROCEDURE — 93010 ELECTROCARDIOGRAM REPORT: CPT

## 2018-06-13 RX ORDER — MAGNESIUM SULFATE 500 MG/ML
1 VIAL (ML) INJECTION
Qty: 0 | Refills: 0 | Status: COMPLETED | OUTPATIENT
Start: 2018-06-13 | End: 2018-06-13

## 2018-06-13 RX ORDER — CLOPIDOGREL BISULFATE 75 MG/1
75 TABLET, FILM COATED ORAL DAILY
Qty: 0 | Refills: 0 | Status: DISCONTINUED | OUTPATIENT
Start: 2018-06-14 | End: 2018-06-14

## 2018-06-13 RX ADMIN — Medication 81 MILLIGRAM(S): at 10:02

## 2018-06-13 RX ADMIN — ATORVASTATIN CALCIUM 80 MILLIGRAM(S): 80 TABLET, FILM COATED ORAL at 22:03

## 2018-06-13 RX ADMIN — Medication 25 MILLIGRAM(S): at 18:20

## 2018-06-13 RX ADMIN — Medication 100 GRAM(S): at 10:02

## 2018-06-13 RX ADMIN — Medication 2: at 08:41

## 2018-06-13 RX ADMIN — Medication 100 GRAM(S): at 13:27

## 2018-06-13 RX ADMIN — Medication 25 MILLIGRAM(S): at 05:18

## 2018-06-13 RX ADMIN — LISINOPRIL 20 MILLIGRAM(S): 2.5 TABLET ORAL at 05:18

## 2018-06-13 RX ADMIN — Medication 500 MILLIGRAM(S): at 10:02

## 2018-06-13 RX ADMIN — Medication 2: at 22:06

## 2018-06-13 RX ADMIN — Medication 1: at 12:33

## 2018-06-13 NOTE — PROGRESS NOTE ADULT - SUBJECTIVE AND OBJECTIVE BOX
INTERVAL HPI/OVERNIGHT EVENTS:  Patient is a 63y Male admitted with chief complaint of chest pain (12 Jun 2018 21:24)    Patient seen and examined at bedside, No acute overnight events. Patient ambulating, NPO, voiding & stooling without any difficulties.      ROS: denies fever, chills, SOB, abdominal pain, diarrhea, calf pain.      Allergies    No Known Allergies    Intolerances        VS:   Vital Signs Last 24 Hrs  T(C): 36.6 (13 Jun 2018 05:15), Max: 36.7 (12 Jun 2018 16:39)  T(F): 97.9 (13 Jun 2018 05:15), Max: 98.1 (12 Jun 2018 16:39)  HR: 74 (13 Jun 2018 05:15) (66 - 79)  BP: 152/90 (13 Jun 2018 05:15) (114/76 - 152/90)  BP(mean): --  RR: 18 (13 Jun 2018 05:15) (18 - 20)  SpO2: 96% (13 Jun 2018 05:15) (96% - 98%)    Physical Exam:   GENERAL: well-groomed, well-developed, NAD, obese  HEENT: head NC/AT; EOM intact  RESPIRATORY: CTA B/L, no wheezing, rales, rhonchi or rubs  CARDIOVASCULAR: S1&S2, RRR, no murmurs or gallops  ABDOMEN: soft, non-tender, non-distended, BS+  MUSCULOSKELETAL: no muscle atrophy, no clubbing, cyanosis of extremities, no calf tenderness, 1+ pitting edema B/L   LYMPH: no lymphadenopathy  SKIN: No rashes, bruises or scars           Labs:                        14.4   8.8   )-----------( 241      ( 12 Jun 2018 07:59 )             41.4   06-12    132<L>  |  95<L>  |  12.0  ----------------------------<  266<H>  4.4   |  23.0  |  0.68    Ca    9.4      12 Jun 2018 07:59  Mg     1.7     06-12    TPro  6.8  /  Alb  4.2  /  TBili  0.5  /  DBili  x   /  AST  30  /  ALT  31  /  AlkPhos  87  06-12    PT/INR - ( 12 Jun 2018 07:59 )   PT: 11.0 sec;   INR: 1.00 ratio         PTT - ( 12 Jun 2018 07:59 )  PTT:31.9 sec  CAPILLARY BLOOD GLUCOSE      POCT Blood Glucose.: 196 mg/dL (12 Jun 2018 22:26)    Radiology:    Medications:  MEDICATIONS  (STANDING):  aspirin enteric coated 81 milliGRAM(s) Oral daily  atorvastatin 80 milliGRAM(s) Oral at bedtime  dextrose 5%. 1000 milliLiter(s) (50 mL/Hr) IV Continuous <Continuous>  dextrose 50% Injectable 12.5 Gram(s) IV Push once  dextrose 50% Injectable 25 Gram(s) IV Push once  dextrose 50% Injectable 25 Gram(s) IV Push once  hydrochlorothiazide 25 milliGRAM(s) Oral daily  insulin lispro (HumaLOG) corrective regimen sliding scale   SubCutaneous Before meals and at bedtime  lisinopril 20 milliGRAM(s) Oral daily  metoprolol tartrate 25 milliGRAM(s) Oral every 12 hours  niacin  milliGRAM(s) Oral daily    MEDICATIONS  (PRN):  dextrose 40% Gel 15 Gram(s) Oral once PRN Blood Glucose LESS THAN 70 milliGRAM(s)/deciliter  glucagon  Injectable 1 milliGRAM(s) IntraMuscular once PRN Glucose LESS THAN 70 milligrams/deciliter  morphine  - Injectable 2 milliGRAM(s) IV Push once PRN Severe Pain (7 - 10)  nitroglycerin     SubLingual 0.4 milliGRAM(s) SubLingual every 5 minutes PRN Chest Pain INTERVAL HPI/OVERNIGHT EVENTS:  Patient is a 63y Male admitted with chief complaint of chest pain (12 Jun 2018 21:24)    Patient seen and examined at bedside, No acute overnight events. Patient stated that his CP have significantly improved since admission (1/10). Patient ambulating, NPO, voiding & stooling without any difficulties.      ROS: denies fever, chills, abdominal pain, diarrhea, calf pain.      Allergies    No Known Allergies    Intolerances        VS:   Vital Signs Last 24 Hrs  T(C): 36.6 (13 Jun 2018 05:15), Max: 36.7 (12 Jun 2018 16:39)  T(F): 97.9 (13 Jun 2018 05:15), Max: 98.1 (12 Jun 2018 16:39)  HR: 74 (13 Jun 2018 05:15) (66 - 79)  BP: 152/90 (13 Jun 2018 05:15) (114/76 - 152/90)  BP(mean): --  RR: 18 (13 Jun 2018 05:15) (18 - 20)  SpO2: 96% (13 Jun 2018 05:15) (96% - 98%)    Physical Exam:   GENERAL: well-groomed, well-developed, NAD, obese  HEENT: head NC/AT; EOM intact  RESPIRATORY: CTA B/L, no wheezing, rales, rhonchi or rubs  CARDIOVASCULAR: S1&S2, RRR, no murmurs or gallops  ABDOMEN: soft, non-tender, non-distended, BS+  MUSCULOSKELETAL: no muscle atrophy, no clubbing, cyanosis of extremities, no calf tenderness, 1+ pitting edema B/L   LYMPH: no lymphadenopathy  SKIN: No rashes, bruises or scars           Labs:                        14.4   8.8   )-----------( 241      ( 12 Jun 2018 07:59 )             41.4   06-12    132<L>  |  95<L>  |  12.0  ----------------------------<  266<H>  4.4   |  23.0  |  0.68    Ca    9.4      12 Jun 2018 07:59  Mg     1.7     06-12    TPro  6.8  /  Alb  4.2  /  TBili  0.5  /  DBili  x   /  AST  30  /  ALT  31  /  AlkPhos  87  06-12    PT/INR - ( 12 Jun 2018 07:59 )   PT: 11.0 sec;   INR: 1.00 ratio         PTT - ( 12 Jun 2018 07:59 )  PTT:31.9 sec  CAPILLARY BLOOD GLUCOSE      POCT Blood Glucose.: 196 mg/dL (12 Jun 2018 22:26)    Radiology:    Medications:  MEDICATIONS  (STANDING):  aspirin enteric coated 81 milliGRAM(s) Oral daily  atorvastatin 80 milliGRAM(s) Oral at bedtime  dextrose 5%. 1000 milliLiter(s) (50 mL/Hr) IV Continuous <Continuous>  dextrose 50% Injectable 12.5 Gram(s) IV Push once  dextrose 50% Injectable 25 Gram(s) IV Push once  dextrose 50% Injectable 25 Gram(s) IV Push once  hydrochlorothiazide 25 milliGRAM(s) Oral daily  insulin lispro (HumaLOG) corrective regimen sliding scale   SubCutaneous Before meals and at bedtime  lisinopril 20 milliGRAM(s) Oral daily  metoprolol tartrate 25 milliGRAM(s) Oral every 12 hours  niacin  milliGRAM(s) Oral daily    MEDICATIONS  (PRN):  dextrose 40% Gel 15 Gram(s) Oral once PRN Blood Glucose LESS THAN 70 milliGRAM(s)/deciliter  glucagon  Injectable 1 milliGRAM(s) IntraMuscular once PRN Glucose LESS THAN 70 milligrams/deciliter  morphine  - Injectable 2 milliGRAM(s) IV Push once PRN Severe Pain (7 - 10)  nitroglycerin     SubLingual 0.4 milliGRAM(s) SubLingual every 5 minutes PRN Chest Pain INTERVAL HPI/OVERNIGHT EVENTS:  Patient is a 63y Male admitted with chief complaint of chest pain (12 Jun 2018 21:24)    Patient seen and examined at bedside, No acute overnight events. Patient stated that his CP have significantly improved since admission (1/10). Patient ambulating, NPO, voiding & stooling without any difficulties.  Cardiac monitor reviewed; sinus arrhythmias - asymptomatic    ROS: denies fever, chills, abdominal pain, diarrhea, calf pain.      Allergies    No Known Allergies    Intolerances        VS:   Vital Signs Last 24 Hrs  T(C): 36.6 (13 Jun 2018 05:15), Max: 36.7 (12 Jun 2018 16:39)  T(F): 97.9 (13 Jun 2018 05:15), Max: 98.1 (12 Jun 2018 16:39)  HR: 74 (13 Jun 2018 05:15) (66 - 79)  BP: 152/90 (13 Jun 2018 05:15) (114/76 - 152/90)  BP(mean): --  RR: 18 (13 Jun 2018 05:15) (18 - 20)  SpO2: 96% (13 Jun 2018 05:15) (96% - 98%)    Physical Exam:   GENERAL: well-groomed, well-developed, NAD, obese  HEENT: head NC/AT; EOM intact  RESPIRATORY: CTA B/L, no wheezing, rales, rhonchi or rubs  CARDIOVASCULAR: S1&S2, RRR, no murmurs or gallops  ABDOMEN: soft, non-tender, non-distended, BS+  MUSCULOSKELETAL: no muscle atrophy, no clubbing, cyanosis of extremities, no calf tenderness, 1+ pitting edema B/L   LYMPH: no lymphadenopathy  SKIN: No rashes, bruises or scars           Labs:                        14.4   8.8   )-----------( 241      ( 12 Jun 2018 07:59 )             41.4   06-12    132<L>  |  95<L>  |  12.0  ----------------------------<  266<H>  4.4   |  23.0  |  0.68    Ca    9.4      12 Jun 2018 07:59  Mg     1.7     06-12    TPro  6.8  /  Alb  4.2  /  TBili  0.5  /  DBili  x   /  AST  30  /  ALT  31  /  AlkPhos  87  06-12    PT/INR - ( 12 Jun 2018 07:59 )   PT: 11.0 sec;   INR: 1.00 ratio         PTT - ( 12 Jun 2018 07:59 )  PTT:31.9 sec  CAPILLARY BLOOD GLUCOSE      POCT Blood Glucose.: 196 mg/dL (12 Jun 2018 22:26)    Radiology:    Medications:  MEDICATIONS  (STANDING):  aspirin enteric coated 81 milliGRAM(s) Oral daily  atorvastatin 80 milliGRAM(s) Oral at bedtime  dextrose 5%. 1000 milliLiter(s) (50 mL/Hr) IV Continuous <Continuous>  dextrose 50% Injectable 12.5 Gram(s) IV Push once  dextrose 50% Injectable 25 Gram(s) IV Push once  dextrose 50% Injectable 25 Gram(s) IV Push once  hydrochlorothiazide 25 milliGRAM(s) Oral daily  insulin lispro (HumaLOG) corrective regimen sliding scale   SubCutaneous Before meals and at bedtime  lisinopril 20 milliGRAM(s) Oral daily  metoprolol tartrate 25 milliGRAM(s) Oral every 12 hours  niacin  milliGRAM(s) Oral daily    MEDICATIONS  (PRN):  dextrose 40% Gel 15 Gram(s) Oral once PRN Blood Glucose LESS THAN 70 milliGRAM(s)/deciliter  glucagon  Injectable 1 milliGRAM(s) IntraMuscular once PRN Glucose LESS THAN 70 milligrams/deciliter  morphine  - Injectable 2 milliGRAM(s) IV Push once PRN Severe Pain (7 - 10)  nitroglycerin     SubLingual 0.4 milliGRAM(s) SubLingual every 5 minutes PRN Chest Pain

## 2018-06-13 NOTE — DISCHARGE NOTE ADULT - PLAN OF CARE
remain free of symptoms Restricted use with no heavy lifting of affected arm for 48 hours.  No submerging the arm in water for 48 hours.  You may start showering today.  Call your doctor for any bleeding, swelling, loss of sensation in the hand or fingers, or fingers turning blue.  If heavy bleeding or large lumps form, hold pressure at the spot and come to the Emergency Room. You were noted to have a myocardial infarction, more commonly known as a heart attack during this hospitalization. It is VERY important to do whatever is necessary in order to avoid having another one of these potentially life ending events! Be sure to take ALL medications exactly as prescribed, do not miss these medications and do not change the dose or how often you take them as their effectiveness is based on being consistent with them. If you smoke, STOP NOW! Smoking can greatly increase your risk of having another heart attack. Please speak to your primary care doctor about different options available to help you do so. If your body can handle it and after you have been cleared by a doctor, getting 30-60min a day of exercise at least 5x a week will help keep your heart healthy and happy. Be sure to follow a heart healthy diet, one full of fresh vegetables, fruits, and if you eat meat, lean meats. Avoid fatty greasy foods or heavily processed foods. Be sure to follow up with your Cardiologist as suggested. Follow a low carb low sugar diet. Continue to take all antidiabetic medications/insulin as prescribed. Follow up with your Primary Care Doctor regularly for blood sugar/A1c checks. Be sure to see an eye doctor and foot doctor on an annual basis. Be sure to follow a low salt diet. If you have been prescribed antihypertensive medications to control your blood pressure, be sure to take them every day as prescribed and do not miss any doses, the medications do not work if they are not taken consistently. Follow up with your Primary Care Doctor and have your Blood Pressure checked.

## 2018-06-13 NOTE — DISCHARGE NOTE ADULT - MEDICATION SUMMARY - MEDICATIONS TO TAKE
I will START or STAY ON the medications listed below when I get home from the hospital:    aspirin 81 mg oral delayed release tablet  -- 1 tab(s) by mouth once a day  -- Indication: For Prevention of heart attacks/strokes    Januvia 100 mg oral tablet  -- 1 tab(s) by mouth once a day  -- Indication: For Diabetes    niacin 500 mg oral capsule, extended release  -- 1 cap(s) by mouth 2 times a day  -- Indication: For Cholesterol    atorvastatin 80 mg oral tablet  -- 1 tab(s) by mouth once a day (at bedtime)  -- Indication: For Cholesterol    lisinopril-hydroCHLOROthiazide 20 mg-25 mg oral tablet  -- 1 tab(s) by mouth once a day  -- Indication: For High blood pressure    clopidogrel 75 mg oral tablet  -- 1 tab(s) by mouth once a day  -- Indication: For Prevention of heart attacks/strokes    metoprolol tartrate 25 mg oral tablet  -- 1 tab(s) by mouth every 12 hours  -- Indication: For Myocardial infarction    potassium chloride 20 mEq oral tablet, extended release  -- 1 tab(s) by mouth once a day  -- Indication: For Supplement

## 2018-06-13 NOTE — CONSULT NOTE ADULT - SUBJECTIVE AND OBJECTIVE BOX
Cardiology Consult    CHIEF COMPLAINT: Chest pain    HISTORY OF PRESENT ILLNESS: CROW AMOR is a 63y old male with a history of DM2, essential hypertension and hyperlipidemia who presents with chest pain. He was working in his garden 3 weeks ago and had exertional fatigue which developed into chest discomfort. Then two nights ago, he was watching TV and starting having pain in the middle of his chest that was initially dull and later became sharp. It was 7/10 at worst and was associated with SOB, nausea and diaphoresis. It was associated with tingling/numbness and shaking of his right arm. After about 30 minutes, he took an Imdur and the pain went away after ~45 minutes but he went to the ER for further evaluation. His cardiac enzymes were initially negative but his CPK and troponin became positive. He had a coronary CTA which showed scattered nonobstructive plaque with an elevated calcium score in the 400s (NOT 4000s!). He has not had any more chest pain since then. He is currently comfortable and denies SOB, palpitations, orthopnea, PND, abdominal pain or syncope.    PROBLEM LIST:    PAST MEDICAL HISTORY:  Carpal tunnel syndrome, bilateral upper limbs  Hyperlipidemia  Type 2 diabetes mellitus without complication, without long-term current use of insulin  Hypertension, unspecified type    PAST SURGICAL HISTORY:  Carpal tunnel syndrome on both sides  No significant past surgical history    MEDICATIONS  (STANDING):  aspirin enteric coated 81 milliGRAM(s) Oral daily  atorvastatin 80 milliGRAM(s) Oral at bedtime  dextrose 5%. 1000 milliLiter(s) (50 mL/Hr) IV Continuous <Continuous>  dextrose 50% Injectable 12.5 Gram(s) IV Push once  dextrose 50% Injectable 25 Gram(s) IV Push once  dextrose 50% Injectable 25 Gram(s) IV Push once  hydrochlorothiazide 25 milliGRAM(s) Oral daily  insulin lispro (HumaLOG) corrective regimen sliding scale   SubCutaneous Before meals and at bedtime  lisinopril 20 milliGRAM(s) Oral daily  magnesium sulfate  IVPB 1 Gram(s) IV Intermittent every 2 hours  metoprolol tartrate 25 milliGRAM(s) Oral every 12 hours  niacin  milliGRAM(s) Oral daily    MEDICATIONS  (PRN):  dextrose 40% Gel 15 Gram(s) Oral once PRN Blood Glucose LESS THAN 70 milliGRAM(s)/deciliter  glucagon  Injectable 1 milliGRAM(s) IntraMuscular once PRN Glucose LESS THAN 70 milligrams/deciliter  morphine  - Injectable 2 milliGRAM(s) IV Push once PRN Severe Pain (7 - 10)  nitroglycerin     SubLingual 0.4 milliGRAM(s) SubLingual every 5 minutes PRN Chest Pain    ALLERGIES:  No Known Allergies    SOCIAL HISTORY:  Tobacco: denies      FAMILY HISTORY:  Family history of brain tumor (Sibling): brother  Family history of lung cancer (Father): father    REVIEW OF SYSTEMS:  Constitutional: + exertional fatigue, no fevers/chills, no weight change  HEENT: no visual disturbances, no hearing loss  Cardiac: + chest pain, no palpitations, no orthopnea, no PND  Respiratory: + shortness of breath, no cough  GI: no abdominal pain, no blood in the stool, + nausea, no vomiting, no diarrhea  Urological: no dysuria, no hematuria  Hematological: no easy bruising or bleeding  Musculoskeletal: no joint pain, no back pain  Neurological: no headaches, no syncope  Psychiatric: no anxiety, no depression  Skin: no rashes    PHYSICAL EXAM:    Weight (kg): 104.3 ( @ 06:17)  T(C): 36.6 (18 @ 05:15), Max: 36.7 (18 @ 16:39)  T(F): 97.9 (18 @ 05:15), Max: 98.1 (18 @ 16:39)  HR: 74 (18 @ 05:15) (66 - 79)  BP: 152/90 (18 @ 05:15) (114/76 - 152/90)  RR: 18 (18 @ 05:15) (18 - 20)  SpO2: 96% (18 @ 05:15) (96% - 98%)  Wt(kg): --  Telemetry: Sinus, HR 60s-90s, APCs  General: comfortable, in NAD  HEENT: EOMI, normocephalic, atraumatic  Neck: no JVD, no carotid bruits  Heart: +S1S2, RRR, no murmurs, no rubs, no gallops  Lungs: clear to auscultation bilaterally, no wheezes, no rales, no rhonchi  Abdomen: soft, non-tender, non-distended, + bowel sounds  Extremities: no clubbing, no cyanosis, 1+ bilateral lower extremity edema  Vascular: 2+ dorsalis pedis pulses bilaterally  Neuro: A&O x3    EKG: Sinus rhythm, HR 91, normal axis, no ST-T wave abnormalities    LABS:    Troponin T, Serum: 0.14 ng/mL (18 @ 07:59)  Troponin T, Serum: 0.16 ng/mL (18 @ 06:35)  Troponin T, Serum: 0.09 ng/mL (18 @ 19:09)        CBC:            14.1   7.7   >-----------< 255     @ 06:35            39.8                 14.4   8.8   >-----------< 241     @ 07:59            41.4         CHEMISTRY:   134   |  95     |  11.0   ----------------------------< 191      @ 06:33    4.5   |  27.0   |  0.77     eGFR non-  97     eGFR   113     132   |  95     |  12.0   ----------------------------< 266      @ 07:59    4.4   |  23.0   |  0.68     eGFR non-  102    eGFR African American  119      Magnesium, Serum: 1.8 mg/dL ( @ 06:35)    TPro --    / Alb 4.2   / TBili 0.5   / DBili --    / AST 30    / ALT 31    / AlkPhos 87      @ 07:59    COAGS:  PT/INR - ( 2018 07:59 )   PT: 11.0 sec;   INR: 1.00 ratio         PTT - ( 2018 07:59 )  PTT:31.9 sec    RADIOLOGY & ADDITIONAL TESTS:  Coronary CTA:  CORONARY:  -DOMINANCE: Left  -LEFT MAIN CORONARY ARTERY: Patent   -ANTERIOR DESCENDING ARTERY:        -PROX:  Patent. Scattered calcified plaques without significant   luminal narrowing.       -MID:  Patent. Scattered calcified plaques without significant   luminal narrowing.       -DISTAL: Patent  -CIRCUMFLEX ARTERY:        -PROX:  Patent       -MID:  Patent       -DISTAL:  Patent  -RIGHT CORONARY ARTERY:        -PROX:  Patent. Scattered calcified plaques without significant   luminal narrowing.       -MID:  Patent. Scattered calcified plaques without significant   luminal narrowing.       -DISTAL:  Patent      Myocardial Function:  The left ventricle is of normal size, wall thickness and function. Cine   display demonstrates no regional wall motion abnormality. LVEDV= 127 cc;   LVESV= 39 cc. Calculated ejection fraction is 69%.    CALCIUM SCORE  Agatston Equivalent Score    Segment                                Score  --------------------------------------------------  Left Main                                0  Left anterior Descending          190.2  Circumflex                              5.1  Right                                      261.3  --------------------------------------------------  Total                                      456.6 - type from report      ASSESSMENT:  CROW AMOR is a 63y old male with a history of DM2, essential hypertension and hyperlipidemia who presented with precordial chest pain and was found to have a NSTEMI.    Please permit me to suggest the followin. The patient appears to be having a NSTEMI type 1. I reviewed the CTA images and I question whether disease in the prox/mid LAD was underread. The calcium score that was reported as 4056 was actually 456, which is still markedly elevated. I have arranged for a cardiac cath to be performed today by Dr. Moss at ~5pm. He received Lovenox last night. He should not receive any more today. Continue aspirin, statin, lopressor and lisinopril. I will hold off on giving plavix for now.

## 2018-06-13 NOTE — DISCHARGE NOTE ADULT - OTHER SIGNIFICANT FINDINGS
13.7   7.6   )-----------( 218      ( 14 Jun 2018 06:06 )             39.3   06-14    136  |  98  |  12.0  ----------------------------<  159<H>  4.3   |  26.0  |  0.78    Ca    8.7      14 Jun 2018 06:06  Phos  3.7     06-14  Mg     2.1     06-14    Lipid Profile in AM (06.13.18 @ 06:35)    Total Cholesterol/HDL Ratio Measurement: 4.0 Ratio    Cholesterol, Serum: 172 mg/dL    Triglycerides, Serum: 357 mg/dL    HDL Cholesterol, Serum: 41 mg/dL    Direct LDL: 60: LDL mg/dL    Troponin T, Serum: 0.05 ng/mL (06.12.18 @ 14:36) CKMB Units: 17.4 ng/mL (06.12.18 @ 14:36) Creatine Kinase, Serum: 437 U/L (06.12.18 @ 14:36)  Troponin T, Serum: 0.09 ng/mL (06.12.18 @ 19:09) CKMB Units: 19.7 ng/mL (06.12.18 @ 19:09) Creatine Kinase, Serum: 450 U/L (06.12.18 @ 19:09)  Troponin T, Serum: 0.16 ng/mL (06.13.18 @ 06:35) CKMB Units: 15.7 ng/mL (06.13.18 @ 06:35) Creatine Kinase, Serum: 426 U/L (06.13.18 @ 06:35)    CAPILLARY BLOOD GLUCOSE  POCT Blood Glucose.: 173 mg/dL (14 Jun 2018 12:05)  POCT Blood Glucose.: 184 mg/dL (14 Jun 2018 08:01)  POCT Blood Glucose.: 216 mg/dL (13 Jun 2018 22:01)  POCT Blood Glucose.: 163 mg/dL (13 Jun 2018 18:19)    < from: CT Angio Cardiac w/ IV Cont (06.12.18 @ 13:51) >  FINDINGS:  VISUALIZED LUNGS: Small calcified granuloma in the right upper lobe, otherwise clear.  MEDIASTINUM:  no significant mediastinal adenopathy.  BONES:  grossly unremarkable   AORTA: No thoracic aortic dissection is noted in the visualized thoracic aorta.  PULMONARY ARTERIES: No pulmonary embolus is noted within the visualized central pulmonary arteries.  PERICARDIUM/CARDIAC STRUCTURES:  normal     CORONARY:  -DOMINANCE: Left  -LEFT MAIN CORONARY ARTERY: Patent   -ANTERIOR DESCENDING ARTERY:        -PROX:  Patent. Scattered calcified plaques without significant luminal narrowing.     -MID:  Patent. Scattered calcified plaques without significant luminal narrowing.     -DISTAL: Patent  -CIRCUMFLEX ARTERY:        -PROX:  Patent     -MID:  Patent     -DISTAL:  Patent  -RIGHT CORONARY ARTERY:        -PROX:  Patent. Scattered calcified plaques without significant luminal narrowing.     -MID:  Patent. Scattered calcified plaques without significant   luminal narrowing.     -DISTAL:  Patent    Myocardial Function:  The left ventricle is of normal size, wall thickness and function. Cine display demonstrates no regional wall motion abnormality. LVEDV= 127 cc; LVESV= 39 cc. Calculated ejection fraction is 69%.    CALCIUM SCORE  Agatston Equivalent Score  Segment                                Score  --------------------------------------------------  Left Main                                0  Left anterior Descending          190.2  Circumflex                              5.1  Right                                      261.3  --------------------------------------------------  Total                                      4056.6  Age/Sex Adjusted Percentile Rating (Raggi, Circulation 2000):   75th percentile.    IMPRESSION:   CT coronary angiography shows:  Left dominant system.  LMCA: Normal. LAD: No significant luminal narrowing. LCx: No significant luminal narrowing. RCA: No significant luminal narrowing.  < end of copied text >    < from: Xray Chest 1 View AP/PA. (06.12.18 @ 07:40) >  FINDINGS:    The lungs  are clear.  No pleural abnormality is seen.       The heart and mediastinum are within normal limits.       Visualized osseous structures are intact.    IMPRESSION:   No evidence of active chest disease.        < end of copied text >    < from: TTE Echo Complete w/Doppler (06.12.18 @ 18:02) >  Summary:   1. Technically limited study.   2. Left ventricular ejection fraction, by visual estimation, is 55 to 60%.   3. Normal global left ventricular systolic function.   4. Spectral Doppler shows impaired relaxation pattern of left ventricular myocardial filling (Grade I diastolic dysfunction).   5. Normal left ventricular internal cavity size.   6. There is no evidence of pericardial effusion.  < end of copied text >

## 2018-06-13 NOTE — PROGRESS NOTE ADULT - ASSESSMENT
64 y/o M w/ PMH of DM2, HTN & HLD presents w / chest pain admitted for NSTEMI s/p full-dose lovenox in ED. cardiac CTA showed elevated calcium score. ASCVD 26.5%    NSTEMI  >Imaging: CXR negative; cardiac CTA showed elevated calcium score.  >Consults: Cardiology (Dr. Valle); Cardiology will come to evaluate tomorrow AM for possible cardiac cauterization   >s/p ASA 325mg x1 in ED; now on ASA 81 mg PO daily  >Metoprolol 25 mg PO Q12H w/ holding parameters  >Lipitor 80 mg PO QHS  >Nitroglycernin Sublingual PRN chest pain  >Morphine 2mg IVP PRN severe pain   >s/p full-dose lovenox in ED    DM2  >Insulin sliding scale & hypoglycemic protocol   >f/u HgbA1c    HTN  >stable  >c/w home med of Lisinopril-HCTZ 20mg/25mg PO daily w/ holding parameters  >added metoprolol 25 mg PO Q12H w/ holding parameters     HLD  >ASCVD 26.5%; started on high-intensity statins   >Lipitor 80 mg PO QHS  >f/u lipid panel     DVT prophylaxis   >s/p full-dose lovenox in ED  >will continue Lovenox 40 mg SQ Q24H if patient stays in the hospital 62 y/o M w/ PMH of DM2, HTN & HLD presents w / chest pain admitted for NSTEMI s/p full-dose lovenox in ED. cardiac CTA showed elevated calcium score. ASCVD 26.5%    NSTEMI  >Imaging: CXR negative; cardiac CTA showed elevated calcium score.  >Consults: Cardiology (Dr. Valle); Cardiology will come to evaluate this morning for possible cardiac cauterization   >s/p ASA 325mg x1 in ED; now on ASA 81 mg PO daily  >Metoprolol 25 mg PO Q12H w/ holding parameters  >Lipitor 80 mg PO QHS  >Nitroglycernin Sublingual PRN chest pain  >Morphine 2mg IVP PRN severe pain   >s/p full-dose lovenox in ED    DM2  >Insulin sliding scale & hypoglycemic protocol   >f/u HgbA1c    HTN  >stable  >c/w home med of Lisinopril-HCTZ 20mg/25mg PO daily w/ holding parameters  >added metoprolol 25 mg PO Q12H w/ holding parameters     HLD  >ASCVD 26.5%; started on high-intensity statins   >Lipitor 80 mg PO QHS  >f/u lipid panel     DVT prophylaxis   >s/p full-dose lovenox in ED  >will continue Lovenox 40 mg SQ Q24H if patient stays in the hospital 64 y/o M w/ PMH of DM2, HTN & HLD presents w / chest pain admitted for NSTEMI s/p full-dose lovenox in ED. cardiac CTA showed elevated calcium score (75th percentile). SHABBIR score 3. ASCVD 26.5%    NSTEMI  >Imaging: CXR negative; cardiac CTA showed elevated calcium score.  >Consults: Cardiology (Dr. Valle); Cardiology will come to evaluate this morning for possible cardiac cauterization   >s/p ASA 325mg x1 in ED; now on ASA 81 mg PO daily  >Metoprolol 25 mg PO Q12H w/ holding parameters  >Lipitor 80 mg PO QHS  >Nitroglycernin Sublingual PRN chest pain  >Morphine 2mg IVP PRN severe pain   >s/p full-dose lovenox in ED    DM2  >Insulin sliding scale & hypoglycemic protocol   >f/u HgbA1c    HTN  >stable  >c/w home med of Lisinopril-HCTZ 20mg/25mg PO daily w/ holding parameters  >added metoprolol 25 mg PO Q12H w/ holding parameters     HLD  >ASCVD 26.5%; started on high-intensity statins   >Lipitor 80 mg PO QHS  >f/u lipid panel     DVT prophylaxis   >s/p full-dose lovenox in ED  >will continue Lovenox 40 mg SQ Q24H if patient stays in the hospital 64 y/o M w/ PMH of DM2, HTN & HLD presents w / chest pain admitted for NSTEMI s/p full-dose lovenox in ED. cardiac CTA showed elevated calcium score (75th percentile). SHABBIR score 3. ASCVD 26.5%    NSTEMI  >Imaging: CXR negative; cardiac CTA showed elevated calcium score.  >Consults: Cardiology (Dr. Valle); Cardiology will come to evaluate this morning for possible cardiac cauterization   >s/p ASA 325mg x1 in ED; now on ASA 81 mg PO daily  >Metoprolol 25 mg PO Q12H w/ holding parameters  >Lipitor 80 mg PO QHS  >Nitroglycernin Sublingual PRN chest pain  >Morphine 2mg IVP PRN severe pain   >s/p full-dose lovenox in ED    DM2  >HgbA1c 7.6  >Insulin sliding scale & hypoglycemic protocol     HTN  >stable  >c/w home med of Lisinopril-HCTZ 20mg/25mg PO daily w/ holding parameters  >added metoprolol 25 mg PO Q12H w/ holding parameters     HLD  >ASCVD 26.5%; started on high-intensity statins   >Lipitor 80 mg PO QHS  >f/u lipid panel     DVT prophylaxis   >s/p full-dose lovenox in ED  >will continue Lovenox 40 mg SQ Q24H if patient stays in the hospital

## 2018-06-13 NOTE — CONSULT NOTE ADULT - SUBJECTIVE AND OBJECTIVE BOX
Patient is a 63y old  Male who presents with a chief complaint of chest pain       HPI:  64 y/o M w/ PMH of DM2, HTN & HLD presents w/ chest pain x4 days. Patient stated that his chest pain began 4 days ago & have been progressively worsening. He described the pain as tightness over epigastric/sternal, 10/10, constant, exacerbated w/ movements, alleviated w/ rest or nitroglycerin & associated w/ SOB. Patient admits to pedal edema but denies of any headache, fever, chills, palpitations, N/V, constipation, diarrhea, dysuria, hematuria, recent travel, LE swelling, calf tenderness or sick contacts. Patient never been a smoker. In the ED patient was found to have elevate troponins w/ normal EKG in the setting of elevated calcium score on cardiac CTA.      PAST MEDICAL & SURGICAL HISTORY:  Carpal tunnel syndrome, bilateral upper limbs: s/p surgical release  Hyperlipidemia  Type 2 diabetes mellitus without complication, without long-term current use of insulin  Hypertension, unspecified type  Carpal tunnel syndrome on both sides: s/p surgical release      Allergies    No Known Allergies    Intolerances        MEDICATIONS  (STANDING):  aspirin enteric coated 81 milliGRAM(s) Oral daily  atorvastatin 80 milliGRAM(s) Oral at bedtime  dextrose 5%. 1000 milliLiter(s) (50 mL/Hr) IV Continuous <Continuous>  dextrose 50% Injectable 12.5 Gram(s) IV Push once  dextrose 50% Injectable 25 Gram(s) IV Push once  dextrose 50% Injectable 25 Gram(s) IV Push once  hydrochlorothiazide 25 milliGRAM(s) Oral daily  insulin lispro (HumaLOG) corrective regimen sliding scale   SubCutaneous Before meals and at bedtime  lisinopril 20 milliGRAM(s) Oral daily  metoprolol tartrate 25 milliGRAM(s) Oral every 12 hours  niacin  milliGRAM(s) Oral daily    MEDICATIONS  (PRN):  dextrose 40% Gel 15 Gram(s) Oral once PRN Blood Glucose LESS THAN 70 milliGRAM(s)/deciliter  glucagon  Injectable 1 milliGRAM(s) IntraMuscular once PRN Glucose LESS THAN 70 milligrams/deciliter  morphine  - Injectable 2 milliGRAM(s) IV Push once PRN Severe Pain (7 - 10)  nitroglycerin     SubLingual 0.4 milliGRAM(s) SubLingual every 5 minutes PRN Chest Pain    aspirin enteric coated 81 milliGRAM(s) Oral daily  atorvastatin 80 milliGRAM(s) Oral at bedtime  dextrose 40% Gel 15 Gram(s) Oral once PRN  dextrose 5%. 1000 milliLiter(s) IV Continuous <Continuous>  dextrose 50% Injectable 12.5 Gram(s) IV Push once  dextrose 50% Injectable 25 Gram(s) IV Push once  dextrose 50% Injectable 25 Gram(s) IV Push once  glucagon  Injectable 1 milliGRAM(s) IntraMuscular once PRN  hydrochlorothiazide 25 milliGRAM(s) Oral daily  insulin lispro (HumaLOG) corrective regimen sliding scale   SubCutaneous Before meals and at bedtime  lisinopril 20 milliGRAM(s) Oral daily  metoprolol tartrate 25 milliGRAM(s) Oral every 12 hours  morphine  - Injectable 2 milliGRAM(s) IV Push once PRN  niacin  milliGRAM(s) Oral daily  nitroglycerin     SubLingual 0.4 milliGRAM(s) SubLingual every 5 minutes PRN      FAMILY HISTORY:  Family history of brain tumor (Sibling): brother  Family history of lung cancer (Father): father      SOCIAL HISTORY:    CIGARETTES:  Never    ALCOHOL:  Occasional    REVIEW OF SYSTEMS:  CONSTITUTIONAL: No fever, weight loss, or fatigue  EYES: No eye pain, visual disturbances, or discharge  ENMT:  No difficulty hearing, tinnitus, vertigo; No sinus or throat pain  NECK: No pain or stiffness  RESPIRATORY: No cough, wheezing, chills or hemoptysis; No Shortness of Breath  CARDIOVASCULAR: No chest pain, palpitations, passing out, dizziness, or leg swelling  GASTROINTESTINAL: No abdominal or epigastric pain. No nausea, vomiting, or hematemesis; No diarrhea or constipation. No melena or hematochezia.  GENITOURINARY: No dysuria, frequency, hematuria, or incontinence  NEUROLOGICAL: No headaches, memory loss, loss of strength, numbness, or tremors  SKIN: No itching, burning, rashes, or lesions   LYMPH Nodes: No enlarged glands  ENDOCRINE: No heat or cold intolerance; No hair loss  MUSCULOSKELETAL: No joint pain or swelling; No muscle, back, or extremity pain  PSYCHIATRIC: No depression, anxiety, mood swings, or difficulty sleeping  HEME/LYMPH: No easy bruising, or bleeding gums  ALLERY AND IMMUNOLOGIC: No hives or eczema	    Vital Signs Last 24 Hrs  T(C): 36.8 (2018 19:59), Max: 37.1 (2018 15:15)  T(F): 98.2 (2018 19:59), Max: 98.7 (2018 15:15)  HR: 74 (2018 19:59) (66 - 83)  BP: 124/64 (2018 19:59) (104/63 - 152/90)  BP(mean): --  RR: 17 (2018 19:59) (16 - 20)  SpO2: 97% (2018 19:59) (96% - 97%)    Daily     Daily Weight in k.7 (2018 00:16)    I&O's Detail    2018 07:01  -  2018 21:38  --------------------------------------------------------  IN:    Oral Fluid: 480 mL  Total IN: 480 mL    OUT:  Total OUT: 0 mL    Total NET: 480 mL          PHYSICAL EXAM:  Appearance: Normal, well nourished	  HEENT:   Normal oral mucosa, PERRL, EOMI, sclera non-icteric	  Lymphatic: No cervical lymphadenopathy  Cardiovascular: Normal S1 S2, No JVD, No cardiac murmurs, No carotid bruits, No peripheral edema  Respiratory: Lungs clear to auscultation	  Psychiatry: A & O x 3, Mood & affect appropriate  Gastrointestinal:  Soft, Non-tender, + BS, no bruits	  Skin: No rashes, No ecchymoses, No cyanosis  Neurologic: Grossly non-focal with full strength in all four extremities  Extremities: Normal range of motion, No clubbing, cyanosis or edema  Vascular: Peripheral pulses palpable 2+ bilaterally      INTERPRETATION OF TELEMETRY:    ECG:    LABS:                        14.1   7.7   )-----------( 255      ( 2018 06:35 )             39.8     06-13    134<L>  |  95<L>  |  11.0  ----------------------------<  191<H>  4.5   |  27.0  |  0.77    Ca    9.3      2018 06:33  Phos  3.4     06-13  Mg     1.8     06-13    TPro  6.8  /  Alb  4.2  /  TBili  0.5  /  DBili  x   /  AST  30  /  ALT  31  /  AlkPhos  87  06-12    CARDIAC MARKERS ( 2018 07:59 )  x     / 0.14 ng/mL / x     / x     / x      CARDIAC MARKERS ( 2018 06:35 )  x     / 0.16 ng/mL / 426 U/L / x     / 15.7 ng/mL  CARDIAC MARKERS ( 2018 19:09 )  x     / 0.09 ng/mL / 450 U/L / x     / 19.7 ng/mL  CARDIAC MARKERS ( 2018 14:36 )  x     / 0.05 ng/mL / 437 U/L / x     / 17.4 ng/mL  CARDIAC MARKERS ( 2018 07:59 )  x     / 0.01 ng/mL / 459 U/L / x     / 12.3 ng/mL      PT/INR - ( 2018 07:59 )   PT: 11.0 sec;   INR: 1.00 ratio         PTT - ( 2018 07:59 )  PTT:31.9 sec    I&O's Summary    2018 07:01  -  2018 21:38  --------------------------------------------------------  IN: 480 mL / OUT: 0 mL / NET: 480 mL      BNP  RADIOLOGY & ADDITIONAL STUDIES:    Assessment:  64 y/o M w/ PMH of DM2, HTN & HLD presents w/ chest pain x4 days. Patient stated that his chest pain began 4 days ago & have been progressively worsening. He described the pain as tightness over epigastric/sternal, 10/10, constant, exacerbated w/ movements, alleviated w/ rest or nitroglycerin & associated w/ SOB. Patient admits to pedal edema but denies of any headache, fever, chills, palpitations, N/V, constipation, diarrhea, dysuria, hematuria, recent travel, LE swelling, calf tenderness or sick contacts. Patient never been a smoker. In the ED patient was found to have elevate troponins w/ normal EKG in the setting of elevated calcium score on cardiac CTA.        Plan:  Cardiac catheterization and possible percutaneous intervention recommended.  Risks, benefits, and alternatives reviewed.  Risks including but not limited to MI, death, stroke, bleeding, infection, vessel injury, hematoma, renal failure, allergic reaction, urgent open heart surgery, restenosis and stent thrombosis were reviewed.  All questions answered.  Patient is agreeable to proceed.

## 2018-06-13 NOTE — DISCHARGE NOTE ADULT - HOSPITAL COURSE
64 y/o M w/ PMH of DM2, HTN & HLD presented to the ED with chest pain admitted for NSTEMI s/p full-dose lovenox in ED. EKG showed NSR but troponins were elevated. A cardiac CTA showed elevated calcium score (75th percentile). Cardiology was consulted and performed a cardiac catherization. 64 y/o M w/ PMH of DM2, HTN & HLD presents w/ chest pain x4 days. Patient stated that his chest pain began 4 days ago & have been progressively worsening. He described the pain as tightness over epigastric/sternal, 10/10, constant, exacerbated w/ movements, alleviated w/ rest or nitroglycerin & associated w/ SOB. Patient admitted to pedal edema but denied any headache, fever, chills, palpitations, N/V, constipation, diarrhea, dysuria, hematuria, recent travel, LE swelling, calf tenderness or sick contacts. Patient never been a smoker. In the ED patient was found to have elevate troponins w/ normal EKG in the setting of elevated calcium score on cardiac CTA. Patient underwent AMISHA x1. Patient found hemodynamically stable, afebrile with good pain control, tolerating PO intake and medically optimized for discharge home.    Time spent coordinating this discharge: 45 minutes.

## 2018-06-13 NOTE — DISCHARGE NOTE ADULT - PATIENT PORTAL LINK FT
You can access the [facility], offered by [facility], by registering with the following website: [email or web address]

## 2018-06-13 NOTE — DISCHARGE NOTE ADULT - INSTRUCTIONS
Continue a DASH (Dietary Approaches to Stop Hypertension) diet. Include more fruits, vegetables, whole grains, and low-fat dairy. Reduce intake of added sugars, solid fats, refined grains, and sodium. Limit foods that are high in saturated fat, such as fatty meats, full-fat dairy products, and tropical oils such as coconut, palm kernel, and palm oils. Limit sugar-sweetened beverages and sweets. When following the DASH eating plan, it is important to choose foods that are:  Low in saturated and trans fats  Rich in potassium, calcium, magnesium, fiber, and protein  Lower in sodium Continue a DASH (Dietary Approaches to Stop Hypertension) diet. Include more fruits, vegetables, whole grains, and low-fat dairy. Reduce intake of added sugars, solid fats, refined grains, and sodium. Limit foods that are high in saturated fat, such as fatty meats, full-fat dairy products, and tropical oils such as coconut, palm kernel, and palm oils. Limit sugar-sweetened beverages and sweets. When following the DASH eating plan, it is important to choose foods that are:  Low in saturated and trans fats  Rich in potassium, calcium, magnesium, fiber, and protein  Lower in sodium    - Healthy carbohydrates: During digestion, sugars (simple carbohydrates) and starches (complex carbohydrates) break down into blood glucose. Focus on the healthiest carbohydrates, such as fruits, vegetables, whole grains, legumes (beans, peas and lentils) and low-fat dairy products.  - Fiber-rich foods. Dietary fiber includes all parts of plant foods that your body can't digest or absorb. Fiber moderates how your body digests and helps control blood sugar levels. Foods high in fiber include vegetables, fruits, nuts, legumes (beans, peas and lentils), whole-wheat flour and wheat bran.

## 2018-06-13 NOTE — CHART NOTE - NSCHARTNOTEFT_GEN_A_CORE
Patient seen and examined at the bedside. Agree with the above history, physical, assessment, and plan as per resident note on the same date.

## 2018-06-13 NOTE — DISCHARGE NOTE ADULT - CARE PLAN
Principal Discharge DX:	CAD (coronary artery disease)  Goal:	remain free of symptoms  Assessment and plan of treatment:	Restricted use with no heavy lifting of affected arm for 48 hours.  No submerging the arm in water for 48 hours.  You may start showering today.  Call your doctor for any bleeding, swelling, loss of sensation in the hand or fingers, or fingers turning blue.  If heavy bleeding or large lumps form, hold pressure at the spot and come to the Emergency Room. Principal Discharge DX:	NSTEMI (non-ST elevation myocardial infarction)  Goal:	remain free of symptoms  Assessment and plan of treatment:	You were noted to have a myocardial infarction, more commonly known as a heart attack during this hospitalization. It is VERY important to do whatever is necessary in order to avoid having another one of these potentially life ending events! Be sure to take ALL medications exactly as prescribed, do not miss these medications and do not change the dose or how often you take them as their effectiveness is based on being consistent with them. If you smoke, STOP NOW! Smoking can greatly increase your risk of having another heart attack. Please speak to your primary care doctor about different options available to help you do so. If your body can handle it and after you have been cleared by a doctor, getting 30-60min a day of exercise at least 5x a week will help keep your heart healthy and happy. Be sure to follow a heart healthy diet, one full of fresh vegetables, fruits, and if you eat meat, lean meats. Avoid fatty greasy foods or heavily processed foods. Be sure to follow up with your Cardiologist as suggested.  Secondary Diagnosis:	Type 2 diabetes mellitus without complication, without long-term current use of insulin  Assessment and plan of treatment:	Follow a low carb low sugar diet. Continue to take all antidiabetic medications/insulin as prescribed. Follow up with your Primary Care Doctor regularly for blood sugar/A1c checks. Be sure to see an eye doctor and foot doctor on an annual basis.  Secondary Diagnosis:	Hypertension, unspecified type  Assessment and plan of treatment:	Be sure to follow a low salt diet. If you have been prescribed antihypertensive medications to control your blood pressure, be sure to take them every day as prescribed and do not miss any doses, the medications do not work if they are not taken consistently. Follow up with your Primary Care Doctor and have your Blood Pressure checked.

## 2018-06-13 NOTE — PROGRESS NOTE ADULT - SUBJECTIVE AND OBJECTIVE BOX
Nurse Practitioner Progress note: 63y old male with a history of DM2, essential hypertension and hyperlipidemia who presents with chest pain initially 3 weeks ago. He went to the ER for further evaluation. His cardiac enzymes were initially negative but his CPK and troponin became positive. He had a coronary CTA which showed scattered nonobstructive plaque with an elevated calcium score in the 400s (NOT 4000s!). He has not had any more chest pain since then. He is currently comfortable and denies SOB, palpitations, orthopnea, PND, abdominal pain or syncope. The plan was made to have a [facility].         MEDICATIONS:  hydrochlorothiazide 25 milliGRAM(s) Oral daily  lisinopril 20 milliGRAM(s) Oral daily  metoprolol tartrate 25 milliGRAM(s) Oral every 12 hours  nitroglycerin     SubLingual 0.4 milliGRAM(s) SubLingual every 5 minutes PRN  morphine  - Injectable 2 milliGRAM(s) IV Push once PRN      atorvastatin 80 milliGRAM(s) Oral at bedtime  dextrose 40% Gel 15 Gram(s) Oral once PRN  dextrose 50% Injectable 12.5 Gram(s) IV Push once  dextrose 50% Injectable 25 Gram(s) IV Push once  dextrose 50% Injectable 25 Gram(s) IV Push once  glucagon  Injectable 1 milliGRAM(s) IntraMuscular once PRN  insulin lispro (HumaLOG) corrective regimen sliding scale   SubCutaneous Before meals and at bedtime  niacin  milliGRAM(s) Oral daily    aspirin enteric coated 81 milliGRAM(s) Oral daily  dextrose 5%. 1000 milliLiter(s) IV Continuous <Continuous>      TELEMETRY: NSR    T(C): 37.1 (06-13-18 @ 15:15), Max: 37.1 (06-13-18 @ 15:15)  HR: 74 (06-13-18 @ 18:00) (66 - 83)  BP: 132/74 (06-13-18 @ 18:00) (104/63 - 152/90)  RR: 18 (06-13-18 @ 18:00) (16 - 20)  SpO2: 96% (06-13-18 @ 18:00) (96% - 97%)  Wt(kg): --    PHYSICAL EXAM:  Appearance: Normal	  HEENT:   Normal oral mucosa, PERRL  Cardiovascular: Normal S1 S2, No JVD, No murmurs, No edema  Respiratory: Lungs clear to auscultation	  Psychiatry: A & O x 3, Mood & affect appropriate  Gastrointestinal:  Soft, Non-tender, + BS	  Skin: No rashes, No ecchymoses, No cyanosis  Neurologic: Non-focal, A&O X3.  No neuro deficits  Extremities: Normal range of motion, No clubbing, cyanosis or edema  Vascular: Peripheral pulses palpable 2+ bilaterally  Procedure Site: right radial no bleeding bruising hematoma                          14.1   7.7   )-----------( 255      ( 13 Jun 2018 06:35 )             39.8     06-13    134<L>  |  95<L>  |  11.0  ----------------------------<  191<H>  4.5   |  27.0  |  0.77    Ca    9.3      13 Jun 2018 06:33  Phos  3.4     06-13  Mg     1.8     06-13    TPro  6.8  /  Alb  4.2  /  TBili  0.5  /  DBili  x   /  AST  30  /  ALT  31  /  AlkPhos  87  06-12    proBNP:   Lipid Profile:   HgA1c: Hemoglobin A1C, Whole Blood: 7.6 % (06-13 @ 06:35)      PROCEDURE RESULTS:    ASSESSMENT/PLAN: 	  -can start to remove band at 1930  -Resume home meds  -Initiate asa 81mg po daily and plavix 75mg po daily to start on 6/14  -Follow up with Dr. Moss in 2 weeks  -Check labs/EKG/site check in AM  -further care per primary team.

## 2018-06-13 NOTE — DISCHARGE NOTE ADULT - MEDICATION SUMMARY - MEDICATIONS TO STOP TAKING
I will STOP taking the medications listed below when I get home from the hospital:    Evekeo 10 mg oral tablet  -- 1 tab(s) by mouth 2 times a day

## 2018-06-14 VITALS
RESPIRATION RATE: 18 BRPM | OXYGEN SATURATION: 94 % | SYSTOLIC BLOOD PRESSURE: 141 MMHG | HEART RATE: 82 BPM | DIASTOLIC BLOOD PRESSURE: 79 MMHG | TEMPERATURE: 98 F

## 2018-06-14 LAB
ANION GAP SERPL CALC-SCNC: 12 MMOL/L — SIGNIFICANT CHANGE UP (ref 5–17)
BASOPHILS # BLD AUTO: 0 K/UL — SIGNIFICANT CHANGE UP (ref 0–0.2)
BASOPHILS NFR BLD AUTO: 0.3 % — SIGNIFICANT CHANGE UP (ref 0–2)
BUN SERPL-MCNC: 12 MG/DL — SIGNIFICANT CHANGE UP (ref 8–20)
CALCIUM SERPL-MCNC: 8.7 MG/DL — SIGNIFICANT CHANGE UP (ref 8.6–10.2)
CHLORIDE SERPL-SCNC: 98 MMOL/L — SIGNIFICANT CHANGE UP (ref 98–107)
CO2 SERPL-SCNC: 26 MMOL/L — SIGNIFICANT CHANGE UP (ref 22–29)
CREAT SERPL-MCNC: 0.78 MG/DL — SIGNIFICANT CHANGE UP (ref 0.5–1.3)
EOSINOPHIL # BLD AUTO: 0.3 K/UL — SIGNIFICANT CHANGE UP (ref 0–0.5)
EOSINOPHIL NFR BLD AUTO: 4.5 % — SIGNIFICANT CHANGE UP (ref 0–5)
GLUCOSE BLDC GLUCOMTR-MCNC: 173 MG/DL — HIGH (ref 70–99)
GLUCOSE BLDC GLUCOMTR-MCNC: 184 MG/DL — HIGH (ref 70–99)
GLUCOSE SERPL-MCNC: 159 MG/DL — HIGH (ref 70–115)
HCT VFR BLD CALC: 39.3 % — LOW (ref 42–52)
HGB BLD-MCNC: 13.7 G/DL — LOW (ref 14–18)
LYMPHOCYTES # BLD AUTO: 2.3 K/UL — SIGNIFICANT CHANGE UP (ref 1–4.8)
LYMPHOCYTES # BLD AUTO: 30.6 % — SIGNIFICANT CHANGE UP (ref 20–55)
MAGNESIUM SERPL-MCNC: 2.1 MG/DL — SIGNIFICANT CHANGE UP (ref 1.6–2.6)
MCHC RBC-ENTMCNC: 29.7 PG — SIGNIFICANT CHANGE UP (ref 27–31)
MCHC RBC-ENTMCNC: 34.9 G/DL — SIGNIFICANT CHANGE UP (ref 32–36)
MCV RBC AUTO: 85.1 FL — SIGNIFICANT CHANGE UP (ref 80–94)
MONOCYTES # BLD AUTO: 1.1 K/UL — HIGH (ref 0–0.8)
MONOCYTES NFR BLD AUTO: 14.4 % — HIGH (ref 3–10)
NEUTROPHILS # BLD AUTO: 3.8 K/UL — SIGNIFICANT CHANGE UP (ref 1.8–8)
NEUTROPHILS NFR BLD AUTO: 49.7 % — SIGNIFICANT CHANGE UP (ref 37–73)
PHOSPHATE SERPL-MCNC: 3.7 MG/DL — SIGNIFICANT CHANGE UP (ref 2.4–4.7)
PLATELET # BLD AUTO: 218 K/UL — SIGNIFICANT CHANGE UP (ref 150–400)
POTASSIUM SERPL-MCNC: 4.3 MMOL/L — SIGNIFICANT CHANGE UP (ref 3.5–5.3)
POTASSIUM SERPL-SCNC: 4.3 MMOL/L — SIGNIFICANT CHANGE UP (ref 3.5–5.3)
RBC # BLD: 4.62 M/UL — SIGNIFICANT CHANGE UP (ref 4.6–6.2)
RBC # FLD: 13 % — SIGNIFICANT CHANGE UP (ref 11–15.6)
SODIUM SERPL-SCNC: 136 MMOL/L — SIGNIFICANT CHANGE UP (ref 135–145)
WBC # BLD: 7.6 K/UL — SIGNIFICANT CHANGE UP (ref 4.8–10.8)
WBC # FLD AUTO: 7.6 K/UL — SIGNIFICANT CHANGE UP (ref 4.8–10.8)

## 2018-06-14 PROCEDURE — 85027 COMPLETE CBC AUTOMATED: CPT

## 2018-06-14 PROCEDURE — 82550 ASSAY OF CK (CPK): CPT

## 2018-06-14 PROCEDURE — 83735 ASSAY OF MAGNESIUM: CPT

## 2018-06-14 PROCEDURE — 82962 GLUCOSE BLOOD TEST: CPT

## 2018-06-14 PROCEDURE — 80048 BASIC METABOLIC PNL TOTAL CA: CPT

## 2018-06-14 PROCEDURE — C1725: CPT

## 2018-06-14 PROCEDURE — 93458 L HRT ARTERY/VENTRICLE ANGIO: CPT | Mod: 59

## 2018-06-14 PROCEDURE — 80053 COMPREHEN METABOLIC PANEL: CPT

## 2018-06-14 PROCEDURE — 75574 CT ANGIO HRT W/3D IMAGE: CPT

## 2018-06-14 PROCEDURE — C1894: CPT

## 2018-06-14 PROCEDURE — 99152 MOD SED SAME PHYS/QHP 5/>YRS: CPT

## 2018-06-14 PROCEDURE — 99153 MOD SED SAME PHYS/QHP EA: CPT

## 2018-06-14 PROCEDURE — C1769: CPT

## 2018-06-14 PROCEDURE — 85610 PROTHROMBIN TIME: CPT

## 2018-06-14 PROCEDURE — 93010 ELECTROCARDIOGRAM REPORT: CPT

## 2018-06-14 PROCEDURE — 83036 HEMOGLOBIN GLYCOSYLATED A1C: CPT

## 2018-06-14 PROCEDURE — 85730 THROMBOPLASTIN TIME PARTIAL: CPT

## 2018-06-14 PROCEDURE — 84484 ASSAY OF TROPONIN QUANT: CPT

## 2018-06-14 PROCEDURE — 84100 ASSAY OF PHOSPHORUS: CPT

## 2018-06-14 PROCEDURE — G0378: CPT

## 2018-06-14 PROCEDURE — 93306 TTE W/DOPPLER COMPLETE: CPT

## 2018-06-14 PROCEDURE — 82553 CREATINE MB FRACTION: CPT

## 2018-06-14 PROCEDURE — 80307 DRUG TEST PRSMV CHEM ANLYZR: CPT

## 2018-06-14 PROCEDURE — C1874: CPT

## 2018-06-14 PROCEDURE — C1887: CPT

## 2018-06-14 PROCEDURE — 36415 COLL VENOUS BLD VENIPUNCTURE: CPT

## 2018-06-14 PROCEDURE — 82272 OCCULT BLD FECES 1-3 TESTS: CPT

## 2018-06-14 PROCEDURE — C9606: CPT | Mod: RC

## 2018-06-14 PROCEDURE — 99285 EMERGENCY DEPT VISIT HI MDM: CPT | Mod: 25

## 2018-06-14 PROCEDURE — 71045 X-RAY EXAM CHEST 1 VIEW: CPT

## 2018-06-14 PROCEDURE — 80061 LIPID PANEL: CPT

## 2018-06-14 PROCEDURE — 93005 ELECTROCARDIOGRAM TRACING: CPT

## 2018-06-14 RX ORDER — CLOPIDOGREL BISULFATE 75 MG/1
1 TABLET, FILM COATED ORAL
Qty: 30 | Refills: 0 | OUTPATIENT
Start: 2018-06-14 | End: 2018-07-13

## 2018-06-14 RX ORDER — METOPROLOL TARTRATE 50 MG
1 TABLET ORAL
Qty: 60 | Refills: 0 | OUTPATIENT
Start: 2018-06-14 | End: 2018-07-13

## 2018-06-14 RX ORDER — ASPIRIN/CALCIUM CARB/MAGNESIUM 324 MG
1 TABLET ORAL
Qty: 30 | Refills: 0 | OUTPATIENT
Start: 2018-06-14 | End: 2018-07-13

## 2018-06-14 RX ORDER — ATORVASTATIN CALCIUM 80 MG/1
1 TABLET, FILM COATED ORAL
Qty: 30 | Refills: 0 | OUTPATIENT
Start: 2018-06-14 | End: 2018-07-13

## 2018-06-14 RX ORDER — AMPHETAMINE SULFATE 5 MG/1
1 TABLET ORAL
Qty: 0 | Refills: 0 | COMMUNITY

## 2018-06-14 RX ADMIN — Medication 500 MILLIGRAM(S): at 11:47

## 2018-06-14 RX ADMIN — Medication 81 MILLIGRAM(S): at 11:47

## 2018-06-14 RX ADMIN — Medication 1: at 08:49

## 2018-06-14 RX ADMIN — CLOPIDOGREL BISULFATE 75 MILLIGRAM(S): 75 TABLET, FILM COATED ORAL at 11:47

## 2018-06-14 NOTE — PROGRESS NOTE ADULT - SUBJECTIVE AND OBJECTIVE BOX
PGY-2 Medicine Progress Note    CROW AMOR  MRN: [id number]    Patient is a 63y old  Male who presents with a chief complaint of chest pain (13 Jun 2018 18:13)    BRIEF HOSPITAL COURSE:  Briefly this is a 62 y/o M w/ PMH of DM2, HTN & HLD who presented with chest pain, found to have NSTEMI.       Events last 24 hours:   s/p cath on 6/13/18 with AMISHA x 1.  He reports feeling well this AM.  Denies CP, SOB, palpitations, lightheadedness, dizziness.  He has no complaints    ROS negative    MEDICATIONS  (STANDING):  aspirin enteric coated 81 milliGRAM(s) Oral daily  atorvastatin 80 milliGRAM(s) Oral at bedtime  clopidogrel Tablet 75 milliGRAM(s) Oral daily  dextrose 5%. 1000 milliLiter(s) (50 mL/Hr) IV Continuous <Continuous>  dextrose 50% Injectable 12.5 Gram(s) IV Push once  dextrose 50% Injectable 25 Gram(s) IV Push once  dextrose 50% Injectable 25 Gram(s) IV Push once  hydrochlorothiazide 25 milliGRAM(s) Oral daily  insulin lispro (HumaLOG) corrective regimen sliding scale   SubCutaneous Before meals and at bedtime  lisinopril 20 milliGRAM(s) Oral daily  metoprolol tartrate 25 milliGRAM(s) Oral every 12 hours  niacin  milliGRAM(s) Oral daily    MEDICATIONS  (PRN):  dextrose 40% Gel 15 Gram(s) Oral once PRN Blood Glucose LESS THAN 70 milliGRAM(s)/deciliter  glucagon  Injectable 1 milliGRAM(s) IntraMuscular once PRN Glucose LESS THAN 70 milligrams/deciliter  morphine  - Injectable 2 milliGRAM(s) IV Push once PRN Severe Pain (7 - 10)  nitroglycerin     SubLingual 0.4 milliGRAM(s) SubLingual every 5 minutes PRN Chest Pain      Vital Signs Last 24 Hrs  T(C): 37.1 (14 Jun 2018 05:34), Max: 37.1 (13 Jun 2018 15:15)  T(F): 98.7 (14 Jun 2018 05:34), Max: 98.7 (13 Jun 2018 15:15)  HR: 77 (14 Jun 2018 05:34) (70 - 83)  BP: 108/58 (14 Jun 2018 05:34) (104/60 - 140/69)  BP(mean): --  RR: 17 (14 Jun 2018 05:34) (16 - 19)  SpO2: 99% (14 Jun 2018 05:34) (95% - 99%)    I&O's Detail    13 Jun 2018 07:01  -  14 Jun 2018 07:00  --------------------------------------------------------  IN:    Oral Fluid: 600 mL  Total IN: 600 mL    OUT:  Total OUT: 0 mL    Total NET: 600 mL                                13.7   7.6   )-----------( 218      ( 14 Jun 2018 06:06 )             39.3     06-14    136  |  98  |  12.0  ----------------------------<  159<H>  4.3   |  26.0  |  0.78    Ca    8.7      14 Jun 2018 06:06  Phos  3.7     06-14  Mg     2.1     06-14        CARDIAC MARKERS ( 13 Jun 2018 07:59 )  x     / 0.14 ng/mL / x     / x     / x      CARDIAC MARKERS ( 13 Jun 2018 06:35 )  x     / 0.16 ng/mL / 426 U/L / x     / 15.7 ng/mL  CARDIAC MARKERS ( 12 Jun 2018 19:09 )  x     / 0.09 ng/mL / 450 U/L / x     / 19.7 ng/mL  CARDIAC MARKERS ( 12 Jun 2018 14:36 )  x     / 0.05 ng/mL / 437 U/L / x     / 17.4 ng/mL      CAPILLARY BLOOD GLUCOSE      POCT Blood Glucose.: 184 mg/dL (14 Jun 2018 08:01)  POCT Blood Glucose.: 216 mg/dL (13 Jun 2018 22:01)  POCT Blood Glucose.: 163 mg/dL (13 Jun 2018 18:19)  POCT Blood Glucose.: 174 mg/dL (13 Jun 2018 12:03)  POCT Blood Glucose.: 217 mg/dL (13 Jun 2018 08:08)        Physical Examination:    General: No acute distress.    HEENT: Pupils equal, reactive to light.  Symmetric.  NECK: Supple, no lymphadenopathy, trachea midline  RESP: Clear to auscultation bilaterally, no significant sputum production  CV: Regular rate and rhythm, no murmurs, rubs, or gallops  ABD: Soft, nondistended, nontender, normoactive bowel sounds, obese  EXT: No edema, nontender  SKIN: Warm and well perfused, no rashes noted.  NEURO: Alert, oriented, interactive, nonfocal

## 2018-06-14 NOTE — PROGRESS NOTE ADULT - SUBJECTIVE AND OBJECTIVE BOX
Cardiology Follow-up    CROW AMOR was seen and examined. No events overnight. The patient denies any chest pain, SOB, palpitations, orthopnea, PND or abdominal pain.He is s/p AMISHA x 1 yesterday.    PROBLEM LIST:    PAST MEDICAL HISTORY:  Carpal tunnel syndrome, bilateral upper limbs  Hyperlipidemia  Type 2 diabetes mellitus without complication, without long-term current use of insulin  Hypertension, unspecified type    PAST SURGICAL HISTORY:  Carpal tunnel syndrome on both sides  No significant past surgical history    MEDICATIONS  (STANDING):  aspirin enteric coated 81 milliGRAM(s) Oral daily  atorvastatin 80 milliGRAM(s) Oral at bedtime  clopidogrel Tablet 75 milliGRAM(s) Oral daily  dextrose 5%. 1000 milliLiter(s) (50 mL/Hr) IV Continuous <Continuous>  dextrose 50% Injectable 12.5 Gram(s) IV Push once  dextrose 50% Injectable 25 Gram(s) IV Push once  dextrose 50% Injectable 25 Gram(s) IV Push once  hydrochlorothiazide 25 milliGRAM(s) Oral daily  insulin lispro (HumaLOG) corrective regimen sliding scale   SubCutaneous Before meals and at bedtime  lisinopril 20 milliGRAM(s) Oral daily  metoprolol tartrate 25 milliGRAM(s) Oral every 12 hours  niacin  milliGRAM(s) Oral daily    MEDICATIONS  (PRN):  dextrose 40% Gel 15 Gram(s) Oral once PRN Blood Glucose LESS THAN 70 milliGRAM(s)/deciliter  glucagon  Injectable 1 milliGRAM(s) IntraMuscular once PRN Glucose LESS THAN 70 milligrams/deciliter  morphine  - Injectable 2 milliGRAM(s) IV Push once PRN Severe Pain (7 - 10)  nitroglycerin     SubLingual 0.4 milliGRAM(s) SubLingual every 5 minutes PRN Chest Pain    ALLERGIES:  No Known Allergies    PHYSICAL EXAM:  T(C): 37.1 (18 @ 05:34), Max: 37.1 (18 @ 15:15)  T(F): 98.7 (18 @ 05:34), Max: 98.7 (18 @ 15:15)  HR: 77 (18 @ 05:34) (70 - 83)  BP: 108/58 (18 @ 05:34) (104/60 - 140/69)  RR: 17 (18 @ 05:34) (16 - 19)  SpO2: 99% (18 @ 05:34) (95% - 99%)  Wt(kg): --  I&O's Summary    2018 07:01  -  2018 07:00  --------------------------------------------------------  IN: 600 mL / OUT: 0 mL / NET: 600 mL      Telemetry: sinus rhythm  General: comfortable, in NAD  Heart: +S1S2, RRR, no murmurs, no rubs, no gallops  Lungs: clear to auscultation bilaterally, no wheezes, no rales, no rhonchi  Abdomen: soft, non-tender, non-distended, + bowel sounds  Extremities: no clubbing, no cyanosis, no edema  Neuro: A&O x3    LABS:    Troponin T, Serum: 0.14 ng/mL ( @ 07:59)  Troponin T, Serum: 0.16 ng/mL ( @ 06:35)  Troponin T, Serum: 0.09 ng/mL ( @ 19:09)  Troponin T, Serum: 0.05 ng/mL ( @ 14:36)  Troponin T, Serum: 0.01 ng/mL ( @ 07:59)                            13.7   7.6   )-----------( 218      ( 2018 06:06 )             39.3     14    136  |  98  |  12.0  ----------------------------<  159<H>  4.3   |  26.0  |  0.78    Ca    8.7      2018 06:06  Phos  3.7     -14  Mg     2.1     14        RADIOLOGY & ADDITIONAL TESTS:    ASSESSMENT:  CROW AMOR is a 63y old male with a history of essential hypertension,NIDDM,hyperlipidemia who presented with chest pain and NSTEMI.He is s/p AMISHA x 1.    Please permit me to suggest the followin. He will need to continue on aspirin and Plavix x 1 year at least  2.He is cleared for discharge from a cardiac standpoint and follow up in office

## 2018-06-14 NOTE — PROGRESS NOTE ADULT - SUBJECTIVE AND OBJECTIVE BOX
Post cath note  Patient feels well  Cardiac monitor  Sinus rhythm  Ekg  Nsr Lad No ischemic changes    MEDICATIONS:  aspirin enteric coated 81 milliGRAM(s) Oral daily  clopidogrel Tablet 75 milliGRAM(s) Oral daily  lisinopril 20 milliGRAM(s) Oral daily  metoprolol tartrate 25 milliGRAM(s) Oral every 12 hours  hydrochlorothiazide 25 milliGRAM(s) Oral daily  nitroglycerin     SubLingual 0.4 milliGRAM(s) SubLingual every 5 minutes PRN  atorvastatin 80 milliGRAM(s) Oral at bedtime  insulin lispro (HumaLOG) corrective regimen sliding scale   SubCutaneous Before meals and at bedtime  niacin  milliGRAM(s) Oral daily    PHYSICAL EXAM:    T(C): 37.1 (18 @ 09:33), Max: 37.1 (18 @ 15:15)  HR: 90 (18 @ 09:33) (70 - 90)  BP: 110/62 (18 @ 09:33) (104/60 - 140/69)  RR: 18 (18 @ 09:33) (16 - 19)  SpO2: 94% (18 @ 09:33) (94% - 99%)  Wt(kg): --    I&O's Summary    2018 07:  -  2018 07:00  --------------------------------------------------------  IN: 600 mL / OUT: 0 mL / NET: 600 mL    2018 07:01  -  2018 12:03  --------------------------------------------------------  IN: 240 mL / OUT: 0 mL / NET: 240 mL      Daily Weight in k.1 (2018 05:31)    Overweight in nad  Neck supple   Lungs clear  Heart s1&s2 regular  Abd soft non tender  right radial artery good pulse    Neuro alert oriented    RADIOLOGY:   DIAGNOSTIC TESTING:  [ ] Echocardiogram:  Summary:   1. Technically limited study.   2. Left ventricular ejection fraction, by visual estimation, is 55 to   60%.   3. Normal global left ventricular systolic function.   4. Spectral Doppler shows impaired relaxation pattern of left   ventricular myocardial filling (Grade I diastolic dysfunction).   5. Normal left ventricular internal cavity size.   6. There is no evidence of pericardial effusion.    [ ]  Catheterization  VENTRICLES: Analysis of regional contractile function demonstrated moderate  diaphragmatic hypokinesis and severe posterobasal hypokinesis. Global left  ventricular function was mildly depressed. EF estimated was 45 %.  VALVES: MITRAL VALVE: The mitral valve exhibited trivial regurgitation  (less than 1+).  CORONARY VESSELS: The coronary circulation is co-dominant.  LM:   --  LM: Normal.  LAD:   --  Mid LAD: There was a discrete 30 % stenosis.  CX:   --  Circumflex: Normal.  RCA:   --  Proximal RCA: Angiography showed mild atherosclerosis with no  flow limiting lesions.   Distal RCA: There was a tubular 100 % stenosis. The lesion was  associated with a moderate filling defect consistent with thrombus.  Distal right AMISHA inserted                        13.7   7.6   )-----------( 218      ( 2018 06:06 )             39.3   06-14    136  |  98  |  12.0  ----------------------------<  159<H>  4.3   |  26.0  |  0.78    Ca    8.7      2018 06:06  Phos  3.7     06-14  Mg     2.1     06-14      64y/o male with PMH of obesity, htn, Hld, Dm who presented with chest discomfort and fatigue over a 4 day period, echo with mild lv dysfunction , elevated ct calcium score and thus referred for cath and had right coronary artery stent placed.      Cad s/p stent plavix asa metoprolol and lipitor  Discussed low saturated fat diet  Regular exercise once cleared by Cardiology  Groin instructions given to patient  For d/c later today

## 2018-06-14 NOTE — PROGRESS NOTE ADULT - ASSESSMENT
62 y/o M w/ PMH of DM2, HTN & HLD presents w / chest pain admitted for NSTEMI s/p full-dose lovenox in ED. s/p cath on 6/13/18 AMISHA x1 RCA.    NSTEMI  >s/p cath AMISHA x 1 RCA  >ASA + plavix per cardio    DM2  >HgbA1c 7.6  >Insulin sliding scale & hypoglycemic protocol     HTN  >stable  >c/w home med of Lisinopril-HCTZ 20mg/25mg PO daily w/ holding parameters  >added metoprolol 25 mg PO Q12H w/ holding parameters     HLD  >ASCVD 26.5%; started on high-intensity statins   >Lipitor 80 mg PO QHS    DVT prophylaxis   >s/p full-dose lovenox in ED

## 2018-07-17 NOTE — ED PROVIDER NOTE - CARDIOVASCULAR [-], MLM
Pt arrived to ED with c/o headaches, head pressure and dizziness. Pt states that she recently had an MRI and was not happy with her neurologists explanation and plan of care. Here to see what options she has and to be treated for her abnormal MRI findings. Also requesting a new neurologist.    no palpitations/no syncope

## 2019-06-27 NOTE — PATIENT PROFILE ADULT. - FUNCTIONAL SCREEN CURRENT LEVEL: BATHING, MLM
EMERGENCY DEPARTMENT HISTORY AND PHYSICAL EXAM    4:23 PM      Date: 6/27/2019  Patient Name: Keturah Santos    History of Presenting Illness     Chief Complaint   Patient presents with    Headache    Dizziness         History Provided By: patient    Additional History (Context): Keturah Santos is a 62 y.o. female presents with 1 day of lightheadedness and feeling nauseous, vomited once. No change in her bowel habits no diarrhea. Foul-smelling urine is present. No back pain chest pain shortness of breath or syncope. Her symptoms are mild and constant. Cornelio Frey PCP: None    Chief Complaint:   Duration:    Timing:    Location:   Quality:   Severity:   Modifying Factors:   Associated Symptoms:       Current Facility-Administered Medications   Medication Dose Route Frequency Provider Last Rate Last Dose    sodium chloride 0.9 % bolus infusion 1,000 mL  1,000 mL IntraVENous ONCE Ofe Mccarty MD        nitrofurantoin (macrocrystal-monohydrate) (MACROBID) capsule 100 mg  100 mg Oral BID Evelyn Christianson MD         Current Outpatient Medications   Medication Sig Dispense Refill    nitrofurantoin, macrocrystal-monohydrate, (MACROBID) 100 mg capsule Take 1 Cap by mouth two (2) times a day for 3 days. 6 Cap 0       Past History     Past Medical History:  History reviewed. No pertinent past medical history. Past Surgical History:  Past Surgical History:   Procedure Laterality Date    HX HERNIA REPAIR         Family History:  History reviewed. No pertinent family history. Social History:  Social History     Tobacco Use    Smoking status: Current Every Day Smoker     Packs/day: 0.25    Smokeless tobacco: Never Used   Substance Use Topics    Alcohol use: Yes     Comment: socially    Drug use: No       Allergies:  No Known Allergies      Review of Systems     Review of Systems   Constitutional: Positive for fatigue. Negative for diaphoresis and fever.    HENT: Negative for congestion and sore throat. Eyes: Negative for pain and itching. Respiratory: Negative for cough and shortness of breath. Cardiovascular: Negative for chest pain and palpitations. Gastrointestinal: Negative for abdominal pain and diarrhea. Endocrine: Negative for polydipsia and polyuria. Genitourinary: Negative for dysuria and hematuria. Musculoskeletal: Negative for arthralgias and myalgias. Skin: Negative for rash and wound. Neurological: Negative for seizures and syncope. Hematological: Does not bruise/bleed easily. Psychiatric/Behavioral: Negative for agitation and hallucinations. Physical Exam       Patient Vitals for the past 12 hrs:   Temp Pulse Resp BP SpO2   06/27/19 1518  (!) 117 20 105/79 100 %   06/27/19 1428 97.5 °F (36.4 °C) (!) 125 16 119/81 95 %       Physical Exam   Constitutional: She appears well-developed and well-nourished. Obese   HENT:   Head: Normocephalic and atraumatic. Eyes: Conjunctivae are normal. No scleral icterus. Neck: Normal range of motion. Neck supple. No JVD present. Cardiovascular: Normal rate, regular rhythm and normal heart sounds. 4 intact extremity pulses   Pulmonary/Chest: Effort normal and breath sounds normal.   Abdominal: Soft. She exhibits no mass. There is no tenderness. Musculoskeletal: Normal range of motion. Lymphadenopathy:     She has no cervical adenopathy. Neurological: She is alert. Skin: Skin is warm and dry. Nursing note and vitals reviewed.         Diagnostic Study Results   Labs -  Recent Results (from the past 12 hour(s))   EKG, 12 LEAD, INITIAL    Collection Time: 06/27/19  2:48 PM   Result Value Ref Range    Ventricular Rate 119 BPM    Atrial Rate 119 BPM    P-R Interval 152 ms    QRS Duration 70 ms    Q-T Interval 310 ms    QTC Calculation (Bezet) 436 ms    Calculated P Axis 70 degrees    Calculated R Axis 48 degrees    Calculated T Axis 25 degrees    Diagnosis       Sinus tachycardia  Biatrial enlargement  Abnormal ECG  When compared with ECG of 22-DEC-2016 18:15,  Vent. rate has increased BY  55 BPM     CBC WITH AUTOMATED DIFF    Collection Time: 06/27/19  3:20 PM   Result Value Ref Range    WBC 7.2 4.6 - 13.2 K/uL    RBC 5.40 (H) 4.20 - 5.30 M/uL    HGB 15.2 12.0 - 16.0 g/dL    HCT 46.2 (H) 35.0 - 45.0 %    MCV 85.6 74.0 - 97.0 FL    MCH 28.1 24.0 - 34.0 PG    MCHC 32.9 31.0 - 37.0 g/dL    RDW 14.0 11.6 - 14.5 %    PLATELET 034 153 - 160 K/uL    MPV 12.0 (H) 9.2 - 11.8 FL    NEUTROPHILS 65 40 - 73 %    LYMPHOCYTES 23 21 - 52 %    MONOCYTES 12 (H) 3 - 10 %    EOSINOPHILS 0 0 - 5 %    BASOPHILS 0 0 - 2 %    ABS. NEUTROPHILS 4.7 1.8 - 8.0 K/UL    ABS. LYMPHOCYTES 1.7 0.9 - 3.6 K/UL    ABS. MONOCYTES 0.9 0.05 - 1.2 K/UL    ABS. EOSINOPHILS 0.0 0.0 - 0.4 K/UL    ABS. BASOPHILS 0.0 0.0 - 0.1 K/UL    DF AUTOMATED     METABOLIC PANEL, COMPREHENSIVE    Collection Time: 06/27/19  3:20 PM   Result Value Ref Range    Sodium 131 (L) 136 - 145 mmol/L    Potassium 4.8 3.5 - 5.5 mmol/L    Chloride 92 (L) 100 - 108 mmol/L    CO2 28 21 - 32 mmol/L    Anion gap 11 3.0 - 18 mmol/L    Glucose 263 (H) 74 - 99 mg/dL    BUN 25 (H) 7.0 - 18 MG/DL    Creatinine 1.84 (H) 0.6 - 1.3 MG/DL    BUN/Creatinine ratio 14 12 - 20      GFR est AA 34 (L) >60 ml/min/1.73m2    GFR est non-AA 28 (L) >60 ml/min/1.73m2    Calcium 9.4 8.5 - 10.1 MG/DL    Bilirubin, total 0.5 0.2 - 1.0 MG/DL    ALT (SGPT) 44 13 - 56 U/L    AST (SGOT) 19 15 - 37 U/L    Alk.  phosphatase 143 (H) 45 - 117 U/L    Protein, total 8.7 (H) 6.4 - 8.2 g/dL    Albumin 3.8 3.4 - 5.0 g/dL    Globulin 4.9 (H) 2.0 - 4.0 g/dL    A-G Ratio 0.8 0.8 - 1.7     CARDIAC PANEL,(CK, CKMB & TROPONIN)    Collection Time: 06/27/19  3:20 PM   Result Value Ref Range     26 - 192 U/L    CK - MB 2.0 <3.6 ng/ml    CK-MB Index 1.3 0.0 - 4.0 %    Troponin-I, QT <0.02 0.0 - 0.045 NG/ML   URINALYSIS W/ RFLX MICROSCOPIC    Collection Time: 06/27/19  4:10 PM   Result Value Ref Range    Color DARK YELLOW Appearance TURBID      Specific gravity 1.027 1.005 - 1.030      pH (UA) 5.0 5.0 - 8.0      Protein 300 (A) NEG mg/dL    Glucose >1,000 (A) NEG mg/dL    Ketone TRACE (A) NEG mg/dL    Bilirubin SMALL (A) NEG      Blood LARGE (A) NEG      Urobilinogen 1.0 0.2 - 1.0 EU/dL    Nitrites POSITIVE (A) NEG      Leukocyte Esterase MODERATE (A) NEG         Radiologic Studies -   XR CHEST PORT   Final Result   Impression:      No acute cardiopulmonary disease. Xr Chest Port    Result Date: 6/27/2019  CHEST AP PORTABLE Indication: Chest pain and shortness of breath. Comparison: 6/19/2016. Findings: The lungs appear clear. The cardiac silhouette and pulmonary vascularity appear within normal limits. No evidence for pneumothorax or pleural effusion. Impression: No acute cardiopulmonary disease. Medications ordered:   Medications   sodium chloride 0.9 % bolus infusion 1,000 mL (has no administration in time range)   nitrofurantoin (macrocrystal-monohydrate) (MACROBID) capsule 100 mg (has no administration in time range)         Medical Decision Making   Initial Medical Decision Making and DDx:     No pain to further focus exam.  Labs reviewed and evidence of dehydration with a slightly elevated creatinine. Will give fluids. Also presence of UTI and will give Macrobid. Discussed this with the patient and her family. Do not suspect stroke, colitis diverticulitis pancreatitis. ED Course: Progress Notes, Reevaluation, and Consults:         I am the first provider for this patient. I reviewed the vital signs, available nursing notes, past medical history, past surgical history, family history and social history. Patient Vitals for the past 12 hrs:   Temp Pulse Resp BP SpO2   06/27/19 1518  (!) 117 20 105/79 100 %   06/27/19 1428 97.5 °F (36.4 °C) (!) 125 16 119/81 95 %       Vital Signs-Reviewed the patient's vital signs.     Pulse Oximetry Analysis, Cardiac Monitor, 12 lead ekg:      Interpreted by the EP.      Records Reviewed: Nursing notes reviewed (Time of Review: 4:23 PM)    Procedures:   Critical Care Time:   Aspirin: (was aspirin given for stroke?)    Diagnosis     Clinical Impression:   1. Lightheadedness    2. Urinary tract infection without hematuria, site unspecified        Disposition: Discharged      Follow-up Information     Follow up With Specialties Details Why Contact Info    [address] [place] [address] Dr Reyes [facility] [address]  [phone]           Patient's Medications   Start Taking    NITROFURANTOIN, MACROCRYSTAL-MONOHYDRATE, (MACROBID) 100 MG CAPSULE    Take 1 Cap by mouth two (2) times a day for 3 days. Continue Taking    No medications on file   These Medications have changed    No medications on file   Stop Taking    OMEPRAZOLE (PRILOSEC) 40 MG CAPSULE    Take 1 Cap by mouth daily.      _______________________________    Notes:    Tyrone Londono MD using Dragon dictation      _______________________________ (0) independent

## 2019-11-01 NOTE — ED ADULT NURSE NOTE - ED CARDIAC HEART SOUNDS
Abdominal Pain/Male





- HPI Summary


HPI Summary: 





45 year old male presents to the ED with sharp abdominal, flank, and testicular 

pain starting 2 days ago, gradually worsening until it was unbearable today. 

Patient reports intermittent pain in his right flank and abdominal pain that 

began 2 days ago, that gradually became worse until it became a constant pain 

of severity 10/10 this morning. The pain radiated to his testicles yesterday. 

Patient had a reduction of an inguinal hernia on his LLQ last week. He reports 

mild pain of his LUQ and left flank. He reports swelling of his right testicle, 

diaphoresis, chills, and subjective fever. He has no appetite and has thus only 

drank broth since lunch yesterday. Patient denies N/V/D, dysuria, and 

hematuria. Patient's urine is darker than nml. Hx of kidney stones, appendectomy

, and inguinal hernia. Patient does not drink alcohol, smoke tobacco, or do 

recreational drugs. Medications reviewed. Allergies noted. 





- History of Current Complaint


Chief Complaint: EDAbdPain


Stated Complaint: RT SIDE/GROIN PAIN POST SURGERY PER PT


Time Seen by Provider: 11/01/19 11:13


Hx Obtained From: Patient


Onset/Duration: Gradual Onset, Lasting Days, Still Present, Worse Since - this 

morning


Timing: Constant - today, Intermittent, Lasting Minutes - starting 2 days ago 

until this morning when it became constant


Severity Initially: Mild


Severity Currently: Severe


Pain Intensity: 11


Pain Scale Used: 0-10 Numeric


Location: Diffuse - RUQ and LUQ especially, Flank


Radiates: Yes


Radiates to: Other - testicles


Character: Sharp


Aggravating Factor(s): Nothing


Alleviating Factor(s): Nothing


Associated Signs And Symptoms: Positive: Diaphoresis, Fever - subjective, 

Decreased Appetite.  Negative: Urinary Symptoms, Nausea, Vomiting, Diarrhea





- Allergies/Home Medications


Allergies/Adverse Reactions: 


 Allergies











Allergy/AdvReac Type Severity Reaction Status Date / Time


 


No Known Allergies Allergy   Verified 11/01/19 10:37











Home Medications: 


 Home Medications





Ibuprofen TAB* [Motrin TAB* 600 MG] 600 mg PO Q6H PRN 11/01/19 [History 

Confirmed 11/01/19]











PMH/Surg Hx/FS Hx/Imm Hx


Endocrine/Hematology History: 


   Denies: Hx Anticoagulant Therapy


Cardiovascular History: 


   Denies: Hx Hypertension, Hx Pacemaker/ICD, Other Cardiovascular Problems/

Disorders


Respiratory History: 


   Denies: Hx Asthma, Other Respiratory Problems/Disorders


GI History: 


   Denies: Other GI Disorders


 History: Reports: Hx Kidney Stones - HX OF IN THE PAST


   Denies: Other  Problems/Disorders


Musculoskeletal History: 


   Denies: Other Musculoskeletal History


Sensory History: 


   Denies: Hx Contacts or Glasses, Hx Hearing Aid


Opthamlomology History: 


   Denies: Hx Contacts or Glasses


Neurological History: 


   Denies: Other Neuro Impairments/Disorders





- Surgical History


Surgery Procedure, Year, and Place: appendix


Hx Anesthesia Reactions: No





- Immunization History


Immunizations Up to Date: Yes


Infectious Disease History: No


Infectious Disease History: 


   Denies: Traveled Outside the [place] in Last 30 Days





- Family History


Known Family History: Positive: Hypertension, Non-Contributory


Family History: Mom is a smoker. Dad is healthy.





- Social History


Alcohol Use: None


Hx Substance Use: No


Substance Use Type: Reports: None


Hx Tobacco Use: No


Smoking Status (MU): Former Smoker


Amount Used/How Often: OFF AND A FEW CIGARETTES PER DAY X 15 YEARS


Have You Smoked in the Last Year: No





Review of Systems


Positive: Fever, Chills, Skin Diaphoresis


Gastrointestinal: Other - lack of appetite


Positive: Abdominal Pain.  Negative: Vomiting, Diarrhea, Nausea


Positive: pain - testicular.  Negative: dysuria, hematuria


All Other Systems Reviewed And Are Negative: Yes





Physical Exam





- Summary


Physical Exam Summary: 





Constitutional: Well-developed, Well-nourished, Alert. (-) Distressed


Skin: Warm, Dry


HENT: Normocephalic; Atraumatic


Eyes: Conjunctiva normal


Neck: Musculoskeletal ROM normal neck. (-) JVD, (-) Stridor, (-) Tracheal 

deviation


Cardio: Rhythm regular, rate normal, Heart sounds normal; Intact distal pulses; 

Radial pulses are 2+ and symmetric. (-) Murmur


Pulmonary/Chest wall: Effort normal. (-) Respiratory distress, (-) Wheezes, (-) 

Rales


Abd: Soft, (-) Distension, (-) Guarding, (-) Rebound. Tenderness to LLQ. No CVA 

tenderness. 


Musculoskeletal: (-) Edema


Lymph: (-) Cervical adenopathy


Neuro: Alert, Oriented x3


Psych: Mood and affect Normal


Testicular Exam: Right testicle twice the size of the left one. No obvious 

cremasteric reflex


Triage Information Reviewed: Yes


Vital Signs On Initial Exam: 


 Initial Vitals











Temp Pulse Resp BP Pulse Ox


 


 99.6 F   92   16   120/83   98 


 


 11/01/19 10:33  11/01/19 10:33  11/01/19 10:33  11/01/19 10:33  11/01/19 10:33











Vital Signs Reviewed: Yes





Procedures





- Sedation


Patient Received Moderate/Deep Sedation with Procedure: No





Diagnostics





- Vital Signs


 Vital Signs











  Temp Pulse Resp BP Pulse Ox


 


 11/01/19 11:37    19  


 


 11/01/19 10:33  99.6 F  92  16  120/83  98














- Laboratory


Lab Results: 


 Lab Results











  11/01/19 Range/Units





  11:28 


 


WBC  21.0 H  (3.5-10.8)  10^3/uL


 


RBC  4.84  (4.18-5.48)  10^6 /uL


 


Hgb  14.9  (14.0-18.0)  g/dL


 


Hct  43  (42-52)  %


 


MCV  89  (80-94)  fL


 


MCH  31  (27-31)  pg


 


MCHC  35  (31-36)  g/dL


 


RDW  12  (10-15)  %


 


Plt Count  281  (150-450)  10^3/uL


 


MPV  7.1 L  (7.4-10.4)  fL


 


Neut % (Auto)  Pending  


 


Lymph % (Auto)  Pending  


 


Mono % (Auto)  Pending  


 


Eos % (Auto)  Pending  


 


Baso % (Auto)  Pending  


 


Absolute Neuts (auto)  Pending  


 


Absolute Lymphs (auto)  Pending  


 


Absolute Monos (auto)  Pending  


 


Absolute Eos (auto)  Pending  


 


Absolute Basos (auto)  Pending  


 


Absolute Nucleated RBC  Pending  


 


Nucleated RBC %  Pending  











Result Diagrams: 


 11/01/19 11:28





 11/01/19 11:28


Lab Statement: Any lab studies that have been ordered have been reviewed, and 

results considered in the medical decision making process.





- CT


  ** AP CT


CT Interpretation Completed By: Radiologist


Summary of CT Findings: POSTOPERATIVE CHANGES ARE CENTERED ABOUT THE LEFT 

INGUINAL CANAL. THERE IS NO DRAINABLE ORGANIZED FLUID COLLECTION.  An ED 

physician has reviewed this report.





- Ultrasound


  ** Testicular US


Ultrasound Interpretation Completed By: Radiologist


Summary of Ultrasound Findings: 1.  NO TESTICULAR PARENCHYMAL MASS.  2.  NO 

SONOGRAPHIC FEATURES OF TORSION. PLEASE NOTE THAT PARTIAL OR INTERMITTENT 

TORSION MAY BE SONOGRAPHICALLY NORMAL..  3.  HYPEREMIC RIGHT TESTICLE AND 

EPIDIDYMIS SUGGESTIVE OF EPIDIDYMOORCHITIS.  An ED physician has reviewed this 

report.





Abdominal Pain Male Course/Dx





- Course


Course Of Treatment: Patient is here with right flank pain that radiates into 

his testicle.  Upon examination, patient has a large right testicle that is 

tender to palpation.  Patient had a CT scan which showed no acute changes 

following his surgery.  Patient had ultrasound which showed epididymitis.  

Patient was not at risk for gonorrhea or chlamydia per him so he was started on 

levofloxacin.





- Diagnoses


Provider Diagnoses: 


 Epididymitis





Is Visit Pregnancy Related: No





Discharge ED





- Sign-Out/Discharge


Documenting (check all that apply): Patient Departure - discharge





- Discharge Plan


Condition: Stable


Disposition: HOME


Prescriptions: 


HYDROcodone/ACETAMIN 5-325 MG* [Norco 5-325 TAB*] 1 tab PO Q8H PRN #12 tab MDD 

3 tablets


 PRN Reason: Pain - Severe


Levofloxacin TAB* [Levaquin 500 Tab*] 500 mg PO DAILY 10 Days #10 tab


Patient Education Materials:  Epididymitis (ED)


Referrals: 


Ameya Celestin DO [Primary Care Provider] - 


Additional Instructions: 


Follow up with your primary care provider in to 1-3 days. Take medications as 

prescribed. Do not exercise for the next 3 weeks due to the high risk of injury 

due to your medications. Return to the Emergency Department if you experience 

worsening pain, fever, or other symptoms. 





- Billing Disposition and Condition


Condition: STABLE


Disposition: Home





- Attestation Statements


Document Initiated by Faustina: Yes


Documenting Scribe: Luke Soto


Provider For Whom Faustina is Documenting (Include Credential): Jac Alvarez MD.


Scribe Attestation: 


Luke RINCON scribed for Jac Alvarez MD. on 11/01/19 at 1824. 


Scribe Documentation Reviewed: Yes


Provider Attestation: 


The documentation as recorded by the Luke klein accurately 

reflects the service I personally performed and the decisions made by me, Jac Alvarez MD.


Status of Scribe Document: Viewed
normal S1, S2 heard

## 2020-06-17 ENCOUNTER — EMERGENCY (EMERGENCY)
Facility: HOSPITAL | Age: 65
LOS: 1 days | Discharge: HOME | End: 2020-06-17
Attending: EMERGENCY MEDICINE
Payer: COMMERCIAL

## 2020-06-17 VITALS
RESPIRATION RATE: 20 BRPM | DIASTOLIC BLOOD PRESSURE: 63 MMHG | HEART RATE: 72 BPM | OXYGEN SATURATION: 96 % | SYSTOLIC BLOOD PRESSURE: 125 MMHG | WEIGHT: 235.01 LBS | HEIGHT: 65 IN | TEMPERATURE: 98 F

## 2020-06-17 DIAGNOSIS — G56.03 CARPAL TUNNEL SYNDROME, BILATERAL UPPER LIMBS: Chronic | ICD-10-CM

## 2020-06-17 PROBLEM — E78.5 HYPERLIPIDEMIA, UNSPECIFIED: Chronic | Status: ACTIVE | Noted: 2018-06-12

## 2020-06-17 LAB
ALBUMIN SERPL ELPH-MCNC: 4.3 G/DL — SIGNIFICANT CHANGE UP (ref 3.3–5.2)
ALP SERPL-CCNC: 65 U/L — SIGNIFICANT CHANGE UP (ref 40–120)
ALT FLD-CCNC: 20 U/L — SIGNIFICANT CHANGE UP
ANION GAP SERPL CALC-SCNC: 13 MMOL/L — SIGNIFICANT CHANGE UP (ref 5–17)
APTT BLD: 32.1 SEC — SIGNIFICANT CHANGE UP (ref 27.5–36.3)
AST SERPL-CCNC: 30 U/L — SIGNIFICANT CHANGE UP
BASOPHILS # BLD AUTO: 0.06 K/UL — SIGNIFICANT CHANGE UP (ref 0–0.2)
BASOPHILS NFR BLD AUTO: 0.8 % — SIGNIFICANT CHANGE UP (ref 0–2)
BILIRUB SERPL-MCNC: 1 MG/DL — SIGNIFICANT CHANGE UP (ref 0.4–2)
BUN SERPL-MCNC: 11 MG/DL — SIGNIFICANT CHANGE UP (ref 8–20)
CALCIUM SERPL-MCNC: 9.2 MG/DL — SIGNIFICANT CHANGE UP (ref 8.6–10.2)
CHLORIDE SERPL-SCNC: 95 MMOL/L — LOW (ref 98–107)
CO2 SERPL-SCNC: 22 MMOL/L — SIGNIFICANT CHANGE UP (ref 22–29)
CREAT SERPL-MCNC: 0.63 MG/DL — SIGNIFICANT CHANGE UP (ref 0.5–1.3)
D DIMER BLD IA.RAPID-MCNC: <150 NG/ML DDU — SIGNIFICANT CHANGE UP
EOSINOPHIL # BLD AUTO: 0.37 K/UL — SIGNIFICANT CHANGE UP (ref 0–0.5)
EOSINOPHIL NFR BLD AUTO: 5.2 % — SIGNIFICANT CHANGE UP (ref 0–6)
GLUCOSE SERPL-MCNC: 92 MG/DL — SIGNIFICANT CHANGE UP (ref 70–99)
HCT VFR BLD CALC: 37.7 % — LOW (ref 39–50)
HGB BLD-MCNC: 13.4 G/DL — SIGNIFICANT CHANGE UP (ref 13–17)
IMM GRANULOCYTES NFR BLD AUTO: 0.3 % — SIGNIFICANT CHANGE UP (ref 0–1.5)
INR BLD: 1.09 RATIO — SIGNIFICANT CHANGE UP (ref 0.88–1.16)
LYMPHOCYTES # BLD AUTO: 2.01 K/UL — SIGNIFICANT CHANGE UP (ref 1–3.3)
LYMPHOCYTES # BLD AUTO: 28.5 % — SIGNIFICANT CHANGE UP (ref 13–44)
MCHC RBC-ENTMCNC: 30.9 PG — SIGNIFICANT CHANGE UP (ref 27–34)
MCHC RBC-ENTMCNC: 35.5 GM/DL — SIGNIFICANT CHANGE UP (ref 32–36)
MCV RBC AUTO: 86.9 FL — SIGNIFICANT CHANGE UP (ref 80–100)
MONOCYTES # BLD AUTO: 0.72 K/UL — SIGNIFICANT CHANGE UP (ref 0–0.9)
MONOCYTES NFR BLD AUTO: 10.2 % — SIGNIFICANT CHANGE UP (ref 2–14)
NEUTROPHILS # BLD AUTO: 3.88 K/UL — SIGNIFICANT CHANGE UP (ref 1.8–7.4)
NEUTROPHILS NFR BLD AUTO: 55 % — SIGNIFICANT CHANGE UP (ref 43–77)
PLATELET # BLD AUTO: 225 K/UL — SIGNIFICANT CHANGE UP (ref 150–400)
POTASSIUM SERPL-MCNC: 4.1 MMOL/L — SIGNIFICANT CHANGE UP (ref 3.5–5.3)
POTASSIUM SERPL-SCNC: 4.1 MMOL/L — SIGNIFICANT CHANGE UP (ref 3.5–5.3)
PROT SERPL-MCNC: 6.1 G/DL — LOW (ref 6.6–8.7)
PROTHROM AB SERPL-ACNC: 12.3 SEC — SIGNIFICANT CHANGE UP (ref 10–12.9)
RBC # BLD: 4.34 M/UL — SIGNIFICANT CHANGE UP (ref 4.2–5.8)
RBC # FLD: 12 % — SIGNIFICANT CHANGE UP (ref 10.3–14.5)
SODIUM SERPL-SCNC: 130 MMOL/L — LOW (ref 135–145)
TROPONIN T SERPL-MCNC: <0.01 NG/ML — SIGNIFICANT CHANGE UP (ref 0–0.06)
WBC # BLD: 7.06 K/UL — SIGNIFICANT CHANGE UP (ref 3.8–10.5)
WBC # FLD AUTO: 7.06 K/UL — SIGNIFICANT CHANGE UP (ref 3.8–10.5)

## 2020-06-17 PROCEDURE — 99220: CPT

## 2020-06-17 PROCEDURE — 71046 X-RAY EXAM CHEST 2 VIEWS: CPT | Mod: 26

## 2020-06-17 PROCEDURE — 93010 ELECTROCARDIOGRAM REPORT: CPT | Mod: 76

## 2020-06-17 PROCEDURE — 70450 CT HEAD/BRAIN W/O DYE: CPT | Mod: 26

## 2020-06-17 RX ORDER — CLOPIDOGREL BISULFATE 75 MG/1
75 TABLET, FILM COATED ORAL DAILY
Refills: 0 | Status: DISCONTINUED | OUTPATIENT
Start: 2020-06-17 | End: 2020-06-22

## 2020-06-17 RX ORDER — ASPIRIN/CALCIUM CARB/MAGNESIUM 324 MG
325 TABLET ORAL ONCE
Refills: 0 | Status: COMPLETED | OUTPATIENT
Start: 2020-06-17 | End: 2020-06-17

## 2020-06-17 RX ORDER — SODIUM CHLORIDE 9 MG/ML
500 INJECTION INTRAMUSCULAR; INTRAVENOUS; SUBCUTANEOUS ONCE
Refills: 0 | Status: COMPLETED | OUTPATIENT
Start: 2020-06-17 | End: 2020-06-17

## 2020-06-17 RX ORDER — LISINOPRIL 2.5 MG/1
10 TABLET ORAL DAILY
Refills: 0 | Status: DISCONTINUED | OUTPATIENT
Start: 2020-06-17 | End: 2020-06-22

## 2020-06-17 RX ORDER — DONEPEZIL HYDROCHLORIDE 10 MG/1
5 TABLET, FILM COATED ORAL AT BEDTIME
Refills: 0 | Status: DISCONTINUED | OUTPATIENT
Start: 2020-06-17 | End: 2020-06-22

## 2020-06-17 RX ORDER — METOPROLOL TARTRATE 50 MG
25 TABLET ORAL ONCE
Refills: 0 | Status: COMPLETED | OUTPATIENT
Start: 2020-06-17 | End: 2020-06-17

## 2020-06-17 RX ORDER — ATORVASTATIN CALCIUM 80 MG/1
40 TABLET, FILM COATED ORAL AT BEDTIME
Refills: 0 | Status: DISCONTINUED | OUTPATIENT
Start: 2020-06-17 | End: 2020-06-22

## 2020-06-17 RX ADMIN — Medication 325 MILLIGRAM(S): at 15:49

## 2020-06-17 RX ADMIN — ATORVASTATIN CALCIUM 40 MILLIGRAM(S): 80 TABLET, FILM COATED ORAL at 22:27

## 2020-06-17 RX ADMIN — DONEPEZIL HYDROCHLORIDE 5 MILLIGRAM(S): 10 TABLET, FILM COATED ORAL at 22:26

## 2020-06-17 RX ADMIN — SODIUM CHLORIDE 500 MILLILITER(S): 9 INJECTION INTRAMUSCULAR; INTRAVENOUS; SUBCUTANEOUS at 20:35

## 2020-06-17 NOTE — ED CDU PROVIDER INITIAL DAY NOTE - ATTENDING CONTRIBUTION TO CARE
I, Wil Moreira, performed the initial face to face bedside interview with this patient regarding history of present illness, review of symptoms and relevant past medical, social and family history.  I completed an independent physical examination.  I was the initial provider who evaluated this patient. I have signed out the follow up of any pending tests (i.e. labs, radiological studies) to the ACP.  I have communicated the patient’s plan of care and disposition with the ACP.

## 2020-06-17 NOTE — ED ADULT TRIAGE NOTE - CHIEF COMPLAINT QUOTE
Pt c/o midsternal chest pain starting yesterday after doing yard work, states pain increases when he leans forward, denies radiation of pain, denies n/v, hx of stents, states he has lost sig amount of weight in past 6 months

## 2020-06-17 NOTE — ED PROVIDER NOTE - PHYSICAL EXAMINATION
Gen: no acute distress  Head: normocephalic, atraumatic  Lung: CTAB, no respiratory distress, no wheezing, rales, rhonchi  Neck: no cervical lymphadenopathy  CV: normal s1/s2, rrr, no jvd  Abd: soft, non-tender, non-distended  MSK: No edema, no visible deformities, full range of motion in all 4 extremities  Neuro: No focal neurologic deficits  Skin: No rash   Psych: normal affect

## 2020-06-17 NOTE — ED CDU PROVIDER INITIAL DAY NOTE - MEDICAL DECISION MAKING DETAILS
64 y/o male h/o cardiac stents placed in observation for chest pain today with exertion  serial trop, cardio to evaluate in AM Dr. Cutler   cardiac monitor

## 2020-06-17 NOTE — ED PROVIDER NOTE - CLINICAL SUMMARY MEDICAL DECISION MAKING FREE TEXT BOX
Pt with hx of CAD presenting with exertional chest pain with relief on nitro. Will order labs, ekg, cxr, trop, and asa then reeval. Pt presenting with several concerning complaints for ca, will eval with ct head, dimer, and coags.

## 2020-06-17 NOTE — ED PROVIDER NOTE - ATTENDING CONTRIBUTION TO CARE
pt comes in c/o chest claudia pressure today with exertion    h/i CAD , h/o HTN , HLD , DM   took 4 tabs of NTG   pt also c/o recent waist loss is being evaluated jazzy possible occult CA   cardiology consult   admission vs obs

## 2020-06-17 NOTE — ED PROVIDER NOTE - OBJECTIVE STATEMENT
66 y/o M with hx of HTN, HLD, DM, stents x1 presenting today with cc of chest pain onset today. Pt states that for the past 10 days he has had a vague substernal chest pain, but today while doing yardwork, the pain acutely worsened. Pain is pleuretic and non-radiating. Pt has a rx for nitroglycerin and states that he took 4 tabs today with improvement in pain. Currently c/o chest pain but reports that it is improved now. Pt also c/o unintentional 100 lbs weight loss over the past 4 months. Notes increased memory difficulty and recent changes in voice. Pt denies any dyspnea, fevers, n/v/d, abdominal pain, dysuria, headache, cough, congestion, neck pain, back pain, weakness, numbness, tingling, dizziness, syncope, or other complaint. Sees [facility]

## 2020-06-17 NOTE — ED ADULT NURSE NOTE - OBJECTIVE STATEMENT
Patient with chest pain activity and pain with deep inspiration.  Patient experienced this while gardening outside. Pt currently normal sinus rhythm on cardiac monitor, no acute distress.

## 2020-06-17 NOTE — CHART NOTE - NSCHARTNOTEFT_GEN_A_CORE
Pt is seen by Dr. Cutler-[facility]. Dr. Busby made aware to contact Dr. Cutler for consult. If any changes please feel free to contact.

## 2020-06-17 NOTE — ED CDU PROVIDER INITIAL DAY NOTE - OBJECTIVE STATEMENT
64 y/o M with hx of HTN, HLD, DM, stents x1 presenting today with cc of chest pain onset today. Pt states that for the past 10 days he has had a vague substernal chest pain, but today while doing yardwork, the pain acutely worsened. Pain is pleuretic and non-radiating. Pt has a rx for nitroglycerin and states that he took 4 tabs today with improvement in pain. Currently c/o chest pain but reports that it is improved now. Pt also c/o unintentional 100 lbs weight loss over the past 4 months. Notes increased memory difficulty and recent changes in voice. Pt denies any dyspnea, fevers, n/v/d, abdominal pain, dysuria, headache, cough, congestion, neck pain, back pain, weakness, numbness, tingling, dizziness, syncope, or other complaint. Sees [facility]

## 2020-06-17 NOTE — ED PROVIDER NOTE - PROGRESS NOTE DETAILS
d/w pt's wife. Corroborated pt's hx that he provided today. States that due to his concerning sx, he is currently being w/u outpt for cancer. As pt's w/u today is not related to possible cancer diagnosis, he can continue w/u as an outpt. Will consult [facility] for chest pain eval and reassess. - Dav Azul, PGY-1

## 2020-06-18 VITALS
DIASTOLIC BLOOD PRESSURE: 82 MMHG | RESPIRATION RATE: 17 BRPM | TEMPERATURE: 98 F | OXYGEN SATURATION: 97 % | SYSTOLIC BLOOD PRESSURE: 124 MMHG | HEART RATE: 68 BPM

## 2020-06-18 PROCEDURE — 80053 COMPREHEN METABOLIC PANEL: CPT

## 2020-06-18 PROCEDURE — 70450 CT HEAD/BRAIN W/O DYE: CPT

## 2020-06-18 PROCEDURE — 99217: CPT

## 2020-06-18 PROCEDURE — 85027 COMPLETE CBC AUTOMATED: CPT

## 2020-06-18 PROCEDURE — G0378: CPT

## 2020-06-18 PROCEDURE — 85379 FIBRIN DEGRADATION QUANT: CPT

## 2020-06-18 PROCEDURE — 93005 ELECTROCARDIOGRAM TRACING: CPT

## 2020-06-18 PROCEDURE — 84484 ASSAY OF TROPONIN QUANT: CPT

## 2020-06-18 PROCEDURE — 85610 PROTHROMBIN TIME: CPT

## 2020-06-18 PROCEDURE — 99284 EMERGENCY DEPT VISIT MOD MDM: CPT

## 2020-06-18 PROCEDURE — 85730 THROMBOPLASTIN TIME PARTIAL: CPT

## 2020-06-18 PROCEDURE — 36415 COLL VENOUS BLD VENIPUNCTURE: CPT

## 2020-06-18 PROCEDURE — 71046 X-RAY EXAM CHEST 2 VIEWS: CPT

## 2020-06-18 RX ORDER — SODIUM CHLORIDE 9 MG/ML
500 INJECTION INTRAMUSCULAR; INTRAVENOUS; SUBCUTANEOUS ONCE
Refills: 0 | Status: COMPLETED | OUTPATIENT
Start: 2020-06-18 | End: 2020-06-18

## 2020-06-18 RX ADMIN — SODIUM CHLORIDE 500 MILLILITER(S): 9 INJECTION INTRAMUSCULAR; INTRAVENOUS; SUBCUTANEOUS at 06:00

## 2020-06-18 RX ADMIN — LISINOPRIL 10 MILLIGRAM(S): 2.5 TABLET ORAL at 05:03

## 2020-06-18 NOTE — ED CDU PROVIDER SUBSEQUENT DAY NOTE - MEDICAL DECISION MAKING DETAILS
66 y/o male h/o cardiac stents placed in observation for chest pain today with exertion  serial trop, cardio to evaluate in AM Dr. Cutler   cardiac monitor

## 2020-06-18 NOTE — ED CDU PROVIDER DISPOSITION NOTE - CLINICAL COURSE
65 year old male with chest pain placed in observation for serial troponins, EKG, negative with no ischemic changes, seen by cardiology, cleared for discharge to f/u as outpatient.  Given copies of all results, understands and agrees to proceed.

## 2020-06-18 NOTE — ED ADULT NURSE REASSESSMENT NOTE - COMFORT CARE
plan of care explained/po fluids offered/ambulated to bathroom/wait time explained
wait time explained/plan of care explained/po fluids offered/darkened lights/meal provided

## 2020-06-18 NOTE — ED CDU PROVIDER DISPOSITION NOTE - PATIENT PORTAL LINK FT
You can access the FollowMyHealth Patient Portal offered by [facility] by registering at the following website: [email or web address]. By joining [employer]’s FollowMyHealth portal, you will also be able to view your health information using other applications (apps) compatible with our system.

## 2020-06-18 NOTE — ED CDU PROVIDER SUBSEQUENT DAY NOTE - ATTENDING CONTRIBUTION TO CARE
64 y/o M with hx of HTN, HLD, DM, stents x1 presenting today with cc of chest pain onset today. pt with 3 neg troponins, pending cardiology consult by Dr kaur

## 2020-06-18 NOTE — CONSULT NOTE ADULT - SUBJECTIVE AND OBJECTIVE BOX
Cardiology Consult    CHIEF COMPLAINT: Chest pain  HISTORY OF PRESENT ILLNESS: CROW AMOR is a 65y old male with a history of AMISHA to the RCA,essential hypertension,hyperlipidemia,NIDDM who presents with chest pain after doing yard work with shortness of breath,no palpitations,intermittent,no radiation,8/10 in intensity.He is presently pain free in the ER.    PROBLEM LIST:    PAST MEDICAL HISTORY:  Carpal tunnel syndrome, bilateral upper limbs  Hyperlipidemia  Type 2 diabetes mellitus without complication, without long-term current use of insulin  Hypertension, unspecified type    PAST SURGICAL HISTORY:  Carpal tunnel syndrome on both sides  No significant past surgical history    MEDICATIONS  (STANDING):  atorvastatin 40 milliGRAM(s) Oral at bedtime  clopidogrel Tablet 75 milliGRAM(s) Oral daily  donepezil 5 milliGRAM(s) Oral at bedtime  lisinopril 10 milliGRAM(s) Oral daily    MEDICATIONS  (PRN):    ALLERGIES:  No Known Allergies    SOCIAL HISTORY:  Tobacco: no   Alcohol: no  Drugs: no    FAMILY HISTORY:  Family history of brain tumor (Sibling): brother  Family history of lung cancer (Father): father    REVIEW OF SYSTEMS:  Constitutional: no fatigue, no fevers/chills, no weight change  HEENT: no visual disturbances, no hearing loss  Cardiac: had chest pain, no palpitations, no orthopnea, no PND  Respiratory: had shortness of breath, no cough  GI: no abdominal pain, no blood in the stool, no N/V, no diarrhea  Urological: no dysuria, no hematuria  Hematological: no easy bruising or bleeding  Musculoskeletal: no joint pain, no back pain  Neurological: no headaches, no syncope  Psychiatric: no anxiety, no depression  Skin: no rashes    PHYSICAL EXAM:    T(C): 36.7 (20 @ 07:15), Max: 36.7 (20 @ 13:47)  T(F): 98 (20 @ 07:15), Max: 98.1 (20 @ 13:47)  HR: 68 (20 @ 07:15) (57 - 76)  BP: 124/82 (20 @ 07:15) (90/56 - 125/63)  RR: 17 (20 @ 07:15) (16 - 20)  SpO2: 97% (20 @ 07:15) (96% - 97%)  Wt(kg): --  Telemetry: sinus bradycardia  General: comfortable, in NAD  HEENT: EOMI, normocephalic, atraumatic  Neck: no JVD, no carotid bruits  Heart: +S1S2, RRR, no murmurs, no rubs, no gallops  Lungs: clear to auscultation bilaterally, no wheezes, no rales, no rhonchi  Abdomen: soft, non-tender, non-distended, + bowel sounds  Extremities: no clubbing, no cyanosis, no edema  Vascular: 2+ dorsalis pedis pulses bilaterally  Neuro: A&O x3    EKG: sinus bradycardia    LABS:    Troponin T, Serum: <0.01 ng/mL ( @ 21:18)  Troponin T, Serum: <0.01 ng/mL ( @ 18:26)  Troponin T, Serum: <0.01 ng/mL ( @ 14:59)    D-Dimer Assay, Quantitative: <150 ng/mL DDU ( @ 14:59)                          13.4   7.06  )-----------( 225      ( 2020 14:59 )             37.7         130<L>  |  95<L>  |  11.0  ----------------------------<  92  4.1   |  22.0  |  0.63    Ca    9.2      2020 14:59    TPro  6.1<L>  /  Alb  4.3  /  TBili  1.0  /  DBili  x   /  AST  30  /  ALT  20  /  AlkPhos  65  -17    PT/INR - ( 2020 14:59 )   PT: 12.3 sec;   INR: 1.09 ratio         PTT - ( 2020 14:59 )  PTT:32.1 sec  RADIOLOGY & ADDITIONAL TESTS:    ASSESSMENT:  CROW AMOR is a 65y old male with a history of AMISHA to the RCA,essential hypertensioin,hyperlipidemia,NIDDM who presented with chest pain.His troponin is negative x 3 and EKG reveals no acute changes.    Please permit me to suggest the followin. He is cleared for discharge from a cardiac standpoint  2.Follow up in office in 1-2 weeks

## 2020-06-18 NOTE — ED ADULT NURSE REASSESSMENT NOTE - NS ED NURSE REASSESS COMMENT FT1
Pt had an uneventful night despite episodes of forgetfulness and confusion. Pt was quickly oriented to situation. Pt had no new c/o chest pain throughout the night.
Assumed care of the patient @2301. Pt A&Ox4. Patient in understanding of plan of care. Patient with no further questions for the RN. Resting in comfort. Call bell within reach and encouraged to use when assistance needed. Will continue to monitor.
Assumed care of the patient @1750 from JESICA Lester. Pt A&Ox4. No c/o pain or discomfort.  Patient in understanding of plan of care. Patient with no further questions for the RN. Resting in comfort. Call bell within reach and encouraged to use when assistance needed. Will continue to monitor.

## 2020-06-18 NOTE — ED CDU PROVIDER SUBSEQUENT DAY NOTE - HISTORY
64 y/o M with hx of HTN, HLD, DM, stents x1 presenting today with cc of chest pain onset today. pt with 3 neg troponins, pending cardiology consult in the am

## 2020-06-18 NOTE — ED CDU PROVIDER DISPOSITION NOTE - ATTENDING CONTRIBUTION TO CARE
65 year old male with chest pain placed in observation for serial troponins, EKG, negative with no ischemic changes, seen by cardiology, cleared for discharge to f/u as outpatient.  No current pain or complaints

## 2020-08-12 NOTE — ED CDU PROVIDER INITIAL DAY NOTE - CONSTITUTIONAL, MLM
Detail Level: Zone Detail Level: Detailed normal... Well appearing, well nourished, awake, alert, oriented to person, place, time/situation and in no apparent distress.

## 2021-05-04 NOTE — ED CDU PROVIDER INITIAL DAY NOTE - OBJECTIVE STATEMENT
Addended by: SHAQUILLE NEFF on: 5/4/2021 02:48 PM     Modules accepted: Orders     64 y/o M with a hx of DM and HTN pt presents to the ED c/o constant, worsening, stabbing mid-chest pain that radiates to his left arm pit area. Notes that he took Isosorbide, prescribed by his PMD, and his pain alleviated. Notes he has had a previous stress test and an overnight at [facility], has not seen his cardiologist in over a year.  Notes he has pedal edema at baseline. Former smoker. Denies cardiac hx, fever, chills. No further complaints at this time.

## 2022-09-01 NOTE — ED ADULT NURSE NOTE - NS_SISCREENINGSR_GEN_ALL_ED
Addended by: OMAR SILVEIRA on: 8/30/2022 04:35 PM     Modules accepted: Orders    
Addended by: OMAR SILVEIRA on: 8/8/2022 02:31 PM     Modules accepted: Orders    
Addended by: SAMARIA CLAYTON on: 9/1/2022 09:52 AM     Modules accepted: Orders    
Negative

## 2023-12-07 ENCOUNTER — APPOINTMENT (OUTPATIENT)
Dept: CARDIOLOGY | Facility: CLINIC | Age: 68
End: 2023-12-07
Payer: MEDICARE

## 2023-12-07 VITALS — DIASTOLIC BLOOD PRESSURE: 94 MMHG | SYSTOLIC BLOOD PRESSURE: 142 MMHG

## 2023-12-07 VITALS
DIASTOLIC BLOOD PRESSURE: 96 MMHG | HEART RATE: 86 BPM | WEIGHT: 209 LBS | OXYGEN SATURATION: 97 % | BODY MASS INDEX: 33.59 KG/M2 | SYSTOLIC BLOOD PRESSURE: 160 MMHG | HEIGHT: 66 IN

## 2023-12-07 DIAGNOSIS — I10 ESSENTIAL (PRIMARY) HYPERTENSION: ICD-10-CM

## 2023-12-07 DIAGNOSIS — Z78.9 OTHER SPECIFIED HEALTH STATUS: ICD-10-CM

## 2023-12-07 DIAGNOSIS — I25.10 ATHEROSCLEROTIC HEART DISEASE OF NATIVE CORONARY ARTERY W/OUT ANGINA PECTORIS: ICD-10-CM

## 2023-12-07 DIAGNOSIS — E78.5 HYPERLIPIDEMIA, UNSPECIFIED: ICD-10-CM

## 2023-12-07 DIAGNOSIS — Z01.818 ENCOUNTER FOR OTHER PREPROCEDURAL EXAMINATION: ICD-10-CM

## 2023-12-07 DIAGNOSIS — Z72.3 LACK OF PHYSICAL EXERCISE: ICD-10-CM

## 2023-12-07 DIAGNOSIS — E11.11 TYPE 2 DIABETES MELLITUS WITH KETOACIDOSIS WITH COME: ICD-10-CM

## 2023-12-07 DIAGNOSIS — R94.31 ABNORMAL ELECTROCARDIOGRAM [ECG] [EKG]: ICD-10-CM

## 2023-12-07 PROBLEM — Z00.00 ENCOUNTER FOR PREVENTIVE HEALTH EXAMINATION: Status: ACTIVE | Noted: 2023-12-07

## 2023-12-07 PROCEDURE — 99204 OFFICE O/P NEW MOD 45 MIN: CPT | Mod: 25

## 2023-12-07 PROCEDURE — 93000 ELECTROCARDIOGRAM COMPLETE: CPT | Mod: NC

## 2023-12-07 RX ORDER — ISOSORBIDE MONONITRATE 30 MG/1
30 TABLET, EXTENDED RELEASE ORAL DAILY
Refills: 0 | Status: ACTIVE | COMMUNITY

## 2023-12-07 RX ORDER — ROSUVASTATIN CALCIUM 40 MG/1
40 TABLET, FILM COATED ORAL DAILY
Refills: 0 | Status: ACTIVE | COMMUNITY

## 2023-12-07 RX ORDER — EMPAGLIFLOZIN 10 MG/1
10 TABLET, FILM COATED ORAL DAILY
Refills: 0 | Status: ACTIVE | COMMUNITY

## 2023-12-07 RX ORDER — LISINOPRIL 10 MG/1
10 TABLET ORAL DAILY
Refills: 0 | Status: ACTIVE | COMMUNITY

## 2023-12-07 RX ORDER — MEMANTINE HYDROCHLORIDE 10 MG/1
10 TABLET, FILM COATED ORAL TWICE DAILY
Refills: 0 | Status: ACTIVE | COMMUNITY

## 2023-12-07 RX ORDER — CLOPIDOGREL 75 MG/1
75 TABLET, FILM COATED ORAL DAILY
Refills: 0 | Status: DISCONTINUED | COMMUNITY
End: 2023-12-07

## 2023-12-07 RX ORDER — DONEPEZIL HYDROCHLORIDE 5 MG/1
5 TABLET, FILM COATED ORAL DAILY
Refills: 0 | Status: ACTIVE | COMMUNITY

## 2024-01-10 ENCOUNTER — APPOINTMENT (OUTPATIENT)
Dept: CARDIOLOGY | Facility: CLINIC | Age: 69
End: 2024-01-10
Payer: MEDICARE

## 2024-01-10 PROCEDURE — 93306 TTE W/DOPPLER COMPLETE: CPT

## 2024-01-10 PROCEDURE — 93880 EXTRACRANIAL BILAT STUDY: CPT

## 2024-01-30 ENCOUNTER — APPOINTMENT (OUTPATIENT)
Dept: CARDIOLOGY | Facility: CLINIC | Age: 69
End: 2024-01-30

## 2024-02-05 ENCOUNTER — APPOINTMENT (OUTPATIENT)
Dept: CARDIOLOGY | Facility: CLINIC | Age: 69
End: 2024-02-05

## 2024-02-07 NOTE — PATIENT PROFILE ADULT. - BLOOD TRANSFUSION, PREVIOUS, PROFILE
no
Patient and/or family announced that they are leaving. They were advised to stay, advised to return if worse./Physician notified

## 2024-02-13 ENCOUNTER — APPOINTMENT (OUTPATIENT)
Dept: CARDIOLOGY | Facility: CLINIC | Age: 69
End: 2024-02-13

## 2024-03-19 ENCOUNTER — APPOINTMENT (OUTPATIENT)
Dept: CARDIOLOGY | Facility: CLINIC | Age: 69
End: 2024-03-19
Payer: MEDICARE

## 2024-03-19 PROCEDURE — 93015 CV STRESS TEST SUPVJ I&R: CPT

## 2024-03-19 PROCEDURE — 78452 HT MUSCLE IMAGE SPECT MULT: CPT

## 2024-03-19 PROCEDURE — A9502: CPT

## 2024-03-20 NOTE — HISTORY OF PRESENT ILLNESS
[FreeTextEntry1] : The history is limited secondary to the patient's dementia.  The history is obtained from his wife. The patient is a 68-year-old white male with a past medical history remarkable for dementia, reported coronary artery disease/PCI, hypertension, hypercholesterolemia, and diabetes who presents for evaluation prior to colonoscopy. The patient does not exercise.  He is symptom-free at rest.

## 2024-03-20 NOTE — PHYSICAL EXAM
[de-identified] : 1/6 systolic murmur [de-identified] : 2+ pitting edema bilaterally to his knees

## 2024-03-20 NOTE — DISCUSSION/SUMMARY
[FreeTextEntry1] : Preoperative cardiovascular evaluation: Colonoscopy  CAD Reported CAD/PCI.  Medical records unavailable  Edema Laboratory results requested. An echocardiogram was ordered to rule out a cardiomyopathy or valvular abnormality as the etiology of his edema, murmur, and abnormal ECG  Abnormal ECG Mr. CROW AMOR was instructed to undergo pharmacologic nuclear stress testing to rule out myocardial ischemia as etiology of his  abnormal ECG in view of his reported coronary artery disease/PCI, hypertension, hypercholesterolemia, diabetes, dementia, and limited activity. If his ischemic evaluation is unremarkable, he will be instructed to initiate an aerobic exercise program. A carotid ultrasound was ordered to rule out obstructive carotid disease as the etiology of his bruit.

## 2024-03-20 NOTE — CARDIOLOGY SUMMARY
[de-identified] : Normal sinus rhythm, increased RS ratio V2 [de-identified] : -Dementia - CAD: Reported CAD/PCI.  Medical records unavailable.  The patient cannot recall where his percutaneous coronary intervention was performed.  He cannot recall the name of his prior cardiologist, PHARMACOLOGIC NUCLEAR STRESS TEST: 3/19/2024, reported normal, EF 68% -ECHOCARDIOGRAM: 1/10/2024, inferior and inferoseptal hypokinesis, EF 60%, mild aortic insufficiency, trace mitral regurgitation and tricuspid regurgitation PA systolic 27 -CAROTID ULTRASOUND: 1/10/2024, , nonobstructive disease -Hypertension -Hypercholesterolemia -Diabetes -Abnormal ECG